# Patient Record
Sex: MALE | Race: OTHER | HISPANIC OR LATINO | URBAN - METROPOLITAN AREA
[De-identification: names, ages, dates, MRNs, and addresses within clinical notes are randomized per-mention and may not be internally consistent; named-entity substitution may affect disease eponyms.]

---

## 2021-10-20 ENCOUNTER — EMERGENCY (EMERGENCY)
Facility: HOSPITAL | Age: 26
LOS: 1 days | Discharge: HOME | End: 2021-10-20
Attending: EMERGENCY MEDICINE | Admitting: STUDENT IN AN ORGANIZED HEALTH CARE EDUCATION/TRAINING PROGRAM
Payer: COMMERCIAL

## 2021-10-20 VITALS
TEMPERATURE: 99 F | DIASTOLIC BLOOD PRESSURE: 98 MMHG | OXYGEN SATURATION: 97 % | SYSTOLIC BLOOD PRESSURE: 141 MMHG | WEIGHT: 237 LBS | HEART RATE: 121 BPM | HEIGHT: 69 IN | RESPIRATION RATE: 18 BRPM
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
## 2021-10-20 DIAGNOSIS — M54.9 DORSALGIA, UNSPECIFIED: ICD-10-CM
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
## 2021-10-20 DIAGNOSIS — M79.89 OTHER SPECIFIED SOFT TISSUE DISORDERS: ICD-10-CM

## 2021-10-20 DIAGNOSIS — N39.0 URINARY TRACT INFECTION, SITE NOT SPECIFIED: ICD-10-CM

## 2021-10-20 DIAGNOSIS — T84.623A INFECTION AND INFLAMMATORY REACTION DUE TO INTERNAL FIXATION DEVICE OF LEFT TIBIA, INITIAL ENCOUNTER: ICD-10-CM

## 2021-10-20 LAB
ALBUMIN SERPL ELPH-MCNC: 4.7 G/DL — SIGNIFICANT CHANGE UP (ref 3.5–5.2)
ALP SERPL-CCNC: 134 U/L — HIGH (ref 30–115)
ALT FLD-CCNC: 23 U/L — SIGNIFICANT CHANGE UP (ref 0–41)
ANION GAP SERPL CALC-SCNC: 17 MMOL/L — HIGH (ref 7–14)
AST SERPL-CCNC: 15 U/L — SIGNIFICANT CHANGE UP (ref 0–41)
BASOPHILS # BLD AUTO: 0.03 K/UL — SIGNIFICANT CHANGE UP (ref 0–0.2)
BASOPHILS NFR BLD AUTO: 0.3 % — SIGNIFICANT CHANGE UP (ref 0–1)
BILIRUB SERPL-MCNC: <0.2 MG/DL — SIGNIFICANT CHANGE UP (ref 0.2–1.2)
BUN SERPL-MCNC: 16 MG/DL — SIGNIFICANT CHANGE UP (ref 10–20)
CALCIUM SERPL-MCNC: 9.8 MG/DL — SIGNIFICANT CHANGE UP (ref 8.5–10.1)
CHLORIDE SERPL-SCNC: 102 MMOL/L — SIGNIFICANT CHANGE UP (ref 98–110)
CO2 SERPL-SCNC: 22 MMOL/L — SIGNIFICANT CHANGE UP (ref 17–32)
CREAT SERPL-MCNC: 0.9 MG/DL — SIGNIFICANT CHANGE UP (ref 0.7–1.5)
EOSINOPHIL # BLD AUTO: 0.05 K/UL — SIGNIFICANT CHANGE UP (ref 0–0.7)
EOSINOPHIL NFR BLD AUTO: 0.5 % — SIGNIFICANT CHANGE UP (ref 0–8)
GLUCOSE SERPL-MCNC: 104 MG/DL — HIGH (ref 70–99)
HCT VFR BLD CALC: 46.6 % — SIGNIFICANT CHANGE UP (ref 42–52)
HGB BLD-MCNC: 14.6 G/DL — SIGNIFICANT CHANGE UP (ref 14–18)
IMM GRANULOCYTES NFR BLD AUTO: 0.3 % — SIGNIFICANT CHANGE UP (ref 0.1–0.3)
LACTATE SERPL-SCNC: 1.2 MMOL/L — SIGNIFICANT CHANGE UP (ref 0.7–2)
LYMPHOCYTES # BLD AUTO: 1.93 K/UL — SIGNIFICANT CHANGE UP (ref 1.2–3.4)
LYMPHOCYTES # BLD AUTO: 20.7 % — SIGNIFICANT CHANGE UP (ref 20.5–51.1)
MCHC RBC-ENTMCNC: 25.8 PG — LOW (ref 27–31)
MCHC RBC-ENTMCNC: 31.3 G/DL — LOW (ref 32–37)
MCV RBC AUTO: 82.5 FL — SIGNIFICANT CHANGE UP (ref 80–94)
MONOCYTES # BLD AUTO: 0.36 K/UL — SIGNIFICANT CHANGE UP (ref 0.1–0.6)
MONOCYTES NFR BLD AUTO: 3.9 % — SIGNIFICANT CHANGE UP (ref 1.7–9.3)
NEUTROPHILS # BLD AUTO: 6.94 K/UL — HIGH (ref 1.4–6.5)
NEUTROPHILS NFR BLD AUTO: 74.3 % — SIGNIFICANT CHANGE UP (ref 42.2–75.2)
NRBC # BLD: 0 /100 WBCS — SIGNIFICANT CHANGE UP (ref 0–0)
PLATELET # BLD AUTO: 345 K/UL — SIGNIFICANT CHANGE UP (ref 130–400)
POTASSIUM SERPL-MCNC: 4 MMOL/L — SIGNIFICANT CHANGE UP (ref 3.5–5)
POTASSIUM SERPL-SCNC: 4 MMOL/L — SIGNIFICANT CHANGE UP (ref 3.5–5)
PROT SERPL-MCNC: 8 G/DL — SIGNIFICANT CHANGE UP (ref 6–8)
RBC # BLD: 5.65 M/UL — SIGNIFICANT CHANGE UP (ref 4.7–6.1)
RBC # FLD: 14.3 % — SIGNIFICANT CHANGE UP (ref 11.5–14.5)
SARS-COV-2 RNA SPEC QL NAA+PROBE: SIGNIFICANT CHANGE UP
SODIUM SERPL-SCNC: 141 MMOL/L — SIGNIFICANT CHANGE UP (ref 135–146)
WBC # BLD: 9.34 K/UL — SIGNIFICANT CHANGE UP (ref 4.8–10.8)
WBC # FLD AUTO: 9.34 K/UL — SIGNIFICANT CHANGE UP (ref 4.8–10.8)

## 2021-10-20 PROCEDURE — 73701 CT LOWER EXTREMITY W/DYE: CPT | Mod: 26,LT,MA

## 2021-10-20 PROCEDURE — 99285 EMERGENCY DEPT VISIT HI MDM: CPT | Mod: 25

## 2021-10-20 RX ORDER — SODIUM CHLORIDE 9 MG/ML
1000 INJECTION, SOLUTION INTRAVENOUS ONCE
Refills: 0 | Status: COMPLETED | OUTPATIENT
Start: 2021-10-20 | End: 2021-10-20

## 2021-10-20 RX ADMIN — SODIUM CHLORIDE 1000 MILLILITER(S): 9 INJECTION, SOLUTION INTRAVENOUS at 19:30

## 2021-10-20 NOTE — ED PROVIDER NOTE - PHYSICAL EXAMINATION
Physical Exam    Vital Signs: I have reviewed the initial vital signs.  Constitutional: well-nourished, appears stated age, no acute distress  Eyes: Conjunctiva pink, Sclera clear, PERRLA, EOMI, no ptosis  Cardiovascular: S1 and S2, regular rate, regular rhythm, well-perfused extremities, radial pulses equal and 2+, calves nonttp, equal in size  Respiratory: unlabored respiratory effort, speaking in full sentences, handling oral secretions,  Musculoskeletal: supple neck, no lower extremity edema, no midline tenderness, paraspinal tenderness, clavicular creptius, painful rom, moving all extremities appropriately, no gross bony deformities + flutant areas of swelling to lateral thigh slightly superior to knee.  Integumentary: warm, dry, no rashes, lacerations,  Neurologic: awake, alert
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
## 2021-10-20 NOTE — ED PROVIDER NOTE - CARE PLAN
Principal Discharge DX:	Back pain  Secondary Diagnosis:	Acute UTI   1 Principal Discharge DX:	Infected hardware in left leg

## 2021-10-20 NOTE — ED PROVIDER NOTE - NSFOLLOWUPCLINICS_GEN_ALL_ED_FT
[facility]  Orthpedic  [address]  [place], [place]   Phone: [phone]  Fax:   Follow Up Time: 1 week

## 2021-10-20 NOTE — ED PROVIDER NOTE - NSFOLLOWUPINSTRUCTIONS_ED_ALL_ED_FT
Please complete your MRI next week and follow up with Orthopaedics after. Follow up information has been provided.    Antibiotics have been sent to your pharmacy.     Return to the Emergency Room if you develop fever, abscess begins to drain from the skin, or pain is worsening.

## 2021-10-20 NOTE — ED PROVIDER NOTE - NS ED ROS FT
Constitutional: (-) fever (-) chills  (-) lightheadedness   Eyes/ENT: (-) blurry vision, (-) epistaxis (-) rhinorrhea (-) nasal congestion  Cardiovascular: (-) chest pain, (-) syncope (-) palpitations   Respiratory: (-) cough, (-) shortness of breath (-) pleurisy   Gastrointestinal: (-) vomiting, (-) diarrhea (-) abdominal pain (-) nausea (-) anorexia  Musculoskeletal: (-) neck pain, (-) back pain, (-) joint pain (-) joint swelling (-) painful ROM  Integumentary: (-) rash, (-) edema (-) lacerations (-) pruritis (+) swelling  Neurological: (-) headache, (-) altered mental status (-) LOC (-) dizziness (-) paresthesias (-)

## 2021-10-20 NOTE — ED PROVIDER NOTE - PROGRESS NOTE DETAILS
Dr. Casillas: received sign out. Pt pending full work up. On reassessment, pt not in any discomfort. CO- pt endorsed to Dr. Bowers- pending ct read, surg c/s Ortho consulted, will come evaluate pt. Discussed over the phone w/ Ortho resident Jenn (who spoke to attending Louis), pt not septic at this time and not requiring emergency surgery. Pt has an MRI scheduled for next week Wednesday, pt will f/u at Ortho clinic after MRI. Will send antibiotics to pharmacy. Return precautions discussed. Ready for discharge.

## 2021-10-20 NOTE — ED PROVIDER NOTE - PATIENT PORTAL LINK FT
You can access the FollowMyHealth Patient Portal offered by [facility] by registering at the following website: [email or web address]. By joining [facility]’s FollowMyHealth portal, you will also be able to view your health information using other applications (apps) compatible with our system.
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
## 2021-10-21 VITALS
DIASTOLIC BLOOD PRESSURE: 75 MMHG | OXYGEN SATURATION: 99 % | SYSTOLIC BLOOD PRESSURE: 116 MMHG | TEMPERATURE: 99 F | RESPIRATION RATE: 18 BRPM | HEART RATE: 85 BPM

## 2021-10-21 LAB
APTT BLD: 33.5 SEC — SIGNIFICANT CHANGE UP (ref 27–39.2)
BLD GP AB SCN SERPL QL: SIGNIFICANT CHANGE UP
BLD GP AB SCN SERPL QL: SIGNIFICANT CHANGE UP
CRP SERPL-MCNC: 17 MG/L — HIGH
ERYTHROCYTE [SEDIMENTATION RATE] IN BLOOD: 40 MM/HR — HIGH (ref 0–10)
INR BLD: 1.11 RATIO — SIGNIFICANT CHANGE UP (ref 0.65–1.3)
PROTHROM AB SERPL-ACNC: 12.8 SEC — SIGNIFICANT CHANGE UP (ref 9.95–12.87)

## 2021-10-21 PROCEDURE — 73560 X-RAY EXAM OF KNEE 1 OR 2: CPT | Mod: 26,LT

## 2021-10-21 PROCEDURE — 73552 X-RAY EXAM OF FEMUR 2/>: CPT | Mod: 26,LT

## 2021-10-21 PROCEDURE — 93010 ELECTROCARDIOGRAM REPORT: CPT

## 2021-10-21 RX ORDER — AZTREONAM 2 G
1 VIAL (EA) INJECTION
Qty: 20 | Refills: 0
Start: 2021-10-21 | End: 2021-10-30
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
## 2021-10-24 ENCOUNTER — INPATIENT (INPATIENT)
Facility: HOSPITAL | Age: 26
LOS: 14 days | Discharge: HOME HEALTH | End: 2021-11-08
Attending: ORTHOPAEDIC SURGERY | Admitting: ORTHOPAEDIC SURGERY
Payer: COMMERCIAL

## 2021-10-24 VITALS
RESPIRATION RATE: 18 BRPM | HEIGHT: 69 IN | HEART RATE: 104 BPM | SYSTOLIC BLOOD PRESSURE: 133 MMHG | OXYGEN SATURATION: 99 % | TEMPERATURE: 98 F | DIASTOLIC BLOOD PRESSURE: 100 MMHG

## 2021-10-24 DIAGNOSIS — E66.9 OBESITY, UNSPECIFIED: ICD-10-CM

## 2021-10-24 DIAGNOSIS — T84.621A INFECTION AND INFLAMMATORY REACTION DUE TO INTERNAL FIXATION DEVICE OF LEFT FEMUR, INITIAL ENCOUNTER: ICD-10-CM
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
## 2021-10-24 DIAGNOSIS — M86.152 OTHER ACUTE OSTEOMYELITIS, LEFT FEMUR: ICD-10-CM
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
## 2021-10-24 DIAGNOSIS — L02.416 CUTANEOUS ABSCESS OF LEFT LOWER LIMB: ICD-10-CM

## 2021-10-24 LAB
ALBUMIN SERPL ELPH-MCNC: 4.9 G/DL — SIGNIFICANT CHANGE UP (ref 3.5–5.2)
ALP SERPL-CCNC: 137 U/L — HIGH (ref 30–115)
ALT FLD-CCNC: 27 U/L — SIGNIFICANT CHANGE UP (ref 0–41)
ANION GAP SERPL CALC-SCNC: 13 MMOL/L — SIGNIFICANT CHANGE UP (ref 7–14)
AST SERPL-CCNC: 15 U/L — SIGNIFICANT CHANGE UP (ref 0–41)
BASOPHILS # BLD AUTO: 0.03 K/UL — SIGNIFICANT CHANGE UP (ref 0–0.2)
BASOPHILS NFR BLD AUTO: 0.3 % — SIGNIFICANT CHANGE UP (ref 0–1)
BILIRUB SERPL-MCNC: <0.2 MG/DL — SIGNIFICANT CHANGE UP (ref 0.2–1.2)
BLD GP AB SCN SERPL QL: SIGNIFICANT CHANGE UP
BUN SERPL-MCNC: 14 MG/DL — SIGNIFICANT CHANGE UP (ref 10–20)
CALCIUM SERPL-MCNC: 10 MG/DL — SIGNIFICANT CHANGE UP (ref 8.5–10.1)
CHLORIDE SERPL-SCNC: 102 MMOL/L — SIGNIFICANT CHANGE UP (ref 98–110)
CO2 SERPL-SCNC: 25 MMOL/L — SIGNIFICANT CHANGE UP (ref 17–32)
CREAT SERPL-MCNC: 0.6 MG/DL — LOW (ref 0.7–1.5)
EOSINOPHIL # BLD AUTO: 0.14 K/UL — SIGNIFICANT CHANGE UP (ref 0–0.7)
EOSINOPHIL NFR BLD AUTO: 1.6 % — SIGNIFICANT CHANGE UP (ref 0–8)
ERYTHROCYTE [SEDIMENTATION RATE] IN BLOOD: 18 MM/HR — HIGH (ref 0–10)
GLUCOSE SERPL-MCNC: 112 MG/DL — HIGH (ref 70–99)
HCT VFR BLD CALC: 44.9 % — SIGNIFICANT CHANGE UP (ref 42–52)
HGB BLD-MCNC: 14 G/DL — SIGNIFICANT CHANGE UP (ref 14–18)
IMM GRANULOCYTES NFR BLD AUTO: 0.3 % — SIGNIFICANT CHANGE UP (ref 0.1–0.3)
LACTATE SERPL-SCNC: 1.2 MMOL/L — SIGNIFICANT CHANGE UP (ref 0.7–2)
LYMPHOCYTES # BLD AUTO: 2.52 K/UL — SIGNIFICANT CHANGE UP (ref 1.2–3.4)
LYMPHOCYTES # BLD AUTO: 29.1 % — SIGNIFICANT CHANGE UP (ref 20.5–51.1)
MCHC RBC-ENTMCNC: 25.5 PG — LOW (ref 27–31)
MCHC RBC-ENTMCNC: 31.2 G/DL — LOW (ref 32–37)
MCV RBC AUTO: 81.8 FL — SIGNIFICANT CHANGE UP (ref 80–94)
MONOCYTES # BLD AUTO: 0.68 K/UL — HIGH (ref 0.1–0.6)
MONOCYTES NFR BLD AUTO: 7.9 % — SIGNIFICANT CHANGE UP (ref 1.7–9.3)
NEUTROPHILS # BLD AUTO: 5.25 K/UL — SIGNIFICANT CHANGE UP (ref 1.4–6.5)
NEUTROPHILS NFR BLD AUTO: 60.8 % — SIGNIFICANT CHANGE UP (ref 42.2–75.2)
NRBC # BLD: 0 /100 WBCS — SIGNIFICANT CHANGE UP (ref 0–0)
PLATELET # BLD AUTO: 313 K/UL — SIGNIFICANT CHANGE UP (ref 130–400)
POTASSIUM SERPL-MCNC: 4.3 MMOL/L — SIGNIFICANT CHANGE UP (ref 3.5–5)
POTASSIUM SERPL-SCNC: 4.3 MMOL/L — SIGNIFICANT CHANGE UP (ref 3.5–5)
PROT SERPL-MCNC: 8.2 G/DL — HIGH (ref 6–8)
RBC # BLD: 5.49 M/UL — SIGNIFICANT CHANGE UP (ref 4.7–6.1)
RBC # FLD: 14.6 % — HIGH (ref 11.5–14.5)
SODIUM SERPL-SCNC: 140 MMOL/L — SIGNIFICANT CHANGE UP (ref 135–146)
WBC # BLD: 8.65 K/UL — SIGNIFICANT CHANGE UP (ref 4.8–10.8)
WBC # FLD AUTO: 8.65 K/UL — SIGNIFICANT CHANGE UP (ref 4.8–10.8)

## 2021-10-24 PROCEDURE — 93010 ELECTROCARDIOGRAM REPORT: CPT

## 2021-10-24 PROCEDURE — 71046 X-RAY EXAM CHEST 2 VIEWS: CPT | Mod: 26

## 2021-10-24 PROCEDURE — 73562 X-RAY EXAM OF KNEE 3: CPT | Mod: 26,LT

## 2021-10-24 PROCEDURE — 99285 EMERGENCY DEPT VISIT HI MDM: CPT
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
## 2021-10-25 LAB
APTT BLD: 30.7 SEC — SIGNIFICANT CHANGE UP (ref 27–39.2)
CRP SERPL-MCNC: 25 MG/L — HIGH
INR BLD: 1.08 RATIO — SIGNIFICANT CHANGE UP (ref 0.65–1.3)
PROTHROM AB SERPL-ACNC: 12.4 SEC — SIGNIFICANT CHANGE UP (ref 9.95–12.87)
SARS-COV-2 RNA SPEC QL NAA+PROBE: SIGNIFICANT CHANGE UP

## 2021-10-25 PROCEDURE — 99221 1ST HOSP IP/OBS SF/LOW 40: CPT

## 2021-10-25 PROCEDURE — 73502 X-RAY EXAM HIP UNI 2-3 VIEWS: CPT | Mod: 26,LT

## 2021-10-25 PROCEDURE — 73552 X-RAY EXAM OF FEMUR 2/>: CPT | Mod: 26,LT

## 2021-10-25 RX ORDER — OXYCODONE HYDROCHLORIDE 5 MG/1
5 TABLET ORAL EVERY 4 HOURS
Refills: 0 | Status: DISCONTINUED | OUTPATIENT
Start: 2021-10-25 | End: 2021-10-25

## 2021-10-25 RX ORDER — HYDROMORPHONE HYDROCHLORIDE 2 MG/ML
1 INJECTION INTRAMUSCULAR; INTRAVENOUS; SUBCUTANEOUS
Refills: 0 | Status: DISCONTINUED | OUTPATIENT
Start: 2021-10-25 | End: 2021-10-25

## 2021-10-25 RX ORDER — SODIUM CHLORIDE 9 MG/ML
1000 INJECTION, SOLUTION INTRAVENOUS
Refills: 0 | Status: DISCONTINUED | OUTPATIENT
Start: 2021-10-25 | End: 2021-10-25

## 2021-10-25 RX ORDER — CEFAZOLIN SODIUM 1 G
2000 VIAL (EA) INJECTION EVERY 8 HOURS
Refills: 0 | Status: DISCONTINUED | OUTPATIENT
Start: 2021-10-26 | End: 2021-10-26

## 2021-10-25 RX ORDER — ACETAMINOPHEN 500 MG
650 TABLET ORAL EVERY 6 HOURS
Refills: 0 | Status: DISCONTINUED | OUTPATIENT
Start: 2021-10-25 | End: 2021-11-01

## 2021-10-25 RX ORDER — ENOXAPARIN SODIUM 100 MG/ML
40 INJECTION SUBCUTANEOUS DAILY
Refills: 0 | Status: DISCONTINUED | OUTPATIENT
Start: 2021-10-25 | End: 2021-10-31

## 2021-10-25 RX ORDER — DIPHENHYDRAMINE HCL 50 MG
25 CAPSULE ORAL AT BEDTIME
Refills: 0 | Status: DISCONTINUED | OUTPATIENT
Start: 2021-10-25 | End: 2021-11-01

## 2021-10-25 RX ORDER — MAGNESIUM HYDROXIDE 400 MG/1
30 TABLET, CHEWABLE ORAL DAILY
Refills: 0 | Status: DISCONTINUED | OUTPATIENT
Start: 2021-10-25 | End: 2021-11-01

## 2021-10-25 RX ORDER — ONDANSETRON 8 MG/1
4 TABLET, FILM COATED ORAL EVERY 6 HOURS
Refills: 0 | Status: DISCONTINUED | OUTPATIENT
Start: 2021-10-25 | End: 2021-10-25

## 2021-10-25 RX ORDER — ENOXAPARIN SODIUM 100 MG/ML
40 INJECTION SUBCUTANEOUS DAILY
Refills: 0 | Status: DISCONTINUED | OUTPATIENT
Start: 2021-10-25 | End: 2021-10-25

## 2021-10-25 RX ORDER — MAGNESIUM HYDROXIDE 400 MG/1
30 TABLET, CHEWABLE ORAL DAILY
Refills: 0 | Status: DISCONTINUED | OUTPATIENT
Start: 2021-10-25 | End: 2021-10-25

## 2021-10-25 RX ORDER — CEFAZOLIN SODIUM 1 G
2000 VIAL (EA) INJECTION EVERY 8 HOURS
Refills: 0 | Status: DISCONTINUED | OUTPATIENT
Start: 2021-10-25 | End: 2021-10-25

## 2021-10-25 RX ORDER — HYDROMORPHONE HYDROCHLORIDE 2 MG/ML
0.5 INJECTION INTRAMUSCULAR; INTRAVENOUS; SUBCUTANEOUS
Refills: 0 | Status: DISCONTINUED | OUTPATIENT
Start: 2021-10-25 | End: 2021-10-25

## 2021-10-25 RX ORDER — ONDANSETRON 8 MG/1
4 TABLET, FILM COATED ORAL ONCE
Refills: 0 | Status: DISCONTINUED | OUTPATIENT
Start: 2021-10-25 | End: 2021-10-25

## 2021-10-25 RX ORDER — DIPHENHYDRAMINE HCL 50 MG
25 CAPSULE ORAL AT BEDTIME
Refills: 0 | Status: DISCONTINUED | OUTPATIENT
Start: 2021-10-25 | End: 2021-10-25

## 2021-10-25 RX ORDER — OXYCODONE HYDROCHLORIDE 5 MG/1
5 TABLET ORAL EVERY 4 HOURS
Refills: 0 | Status: DISCONTINUED | OUTPATIENT
Start: 2021-10-25 | End: 2021-11-01

## 2021-10-25 RX ORDER — ONDANSETRON 8 MG/1
4 TABLET, FILM COATED ORAL EVERY 6 HOURS
Refills: 0 | Status: DISCONTINUED | OUTPATIENT
Start: 2021-10-25 | End: 2021-11-01

## 2021-10-25 RX ORDER — ACETAMINOPHEN 500 MG
650 TABLET ORAL EVERY 6 HOURS
Refills: 0 | Status: DISCONTINUED | OUTPATIENT
Start: 2021-10-25 | End: 2021-10-25

## 2021-10-25 RX ADMIN — HYDROMORPHONE HYDROCHLORIDE 0.5 MILLIGRAM(S): 2 INJECTION INTRAMUSCULAR; INTRAVENOUS; SUBCUTANEOUS at 21:00

## 2021-10-25 RX ADMIN — Medication 100 MILLIGRAM(S): at 00:49

## 2021-10-25 RX ADMIN — Medication 100 MILLIGRAM(S): at 08:28

## 2021-10-25 RX ADMIN — SODIUM CHLORIDE 75 MILLILITER(S): 9 INJECTION, SOLUTION INTRAVENOUS at 08:30

## 2021-10-25 RX ADMIN — HYDROMORPHONE HYDROCHLORIDE 1 MILLIGRAM(S): 2 INJECTION INTRAMUSCULAR; INTRAVENOUS; SUBCUTANEOUS at 21:30

## 2021-10-25 RX ADMIN — HYDROMORPHONE HYDROCHLORIDE 0.5 MILLIGRAM(S): 2 INJECTION INTRAMUSCULAR; INTRAVENOUS; SUBCUTANEOUS at 21:15

## 2021-10-25 RX ADMIN — Medication 100 MILLIGRAM(S): at 16:12

## 2021-10-25 RX ADMIN — Medication 1 TABLET(S): at 16:12

## 2021-10-25 RX ADMIN — HYDROMORPHONE HYDROCHLORIDE 1 MILLIGRAM(S): 2 INJECTION INTRAMUSCULAR; INTRAVENOUS; SUBCUTANEOUS at 21:15

## 2021-10-25 RX ADMIN — HYDROMORPHONE HYDROCHLORIDE 0.5 MILLIGRAM(S): 2 INJECTION INTRAMUSCULAR; INTRAVENOUS; SUBCUTANEOUS at 20:45

## 2021-10-25 NOTE — ED PROVIDER NOTE - CLINICAL SUMMARY MEDICAL DECISION MAKING FREE TEXT BOX
27 yo M, healthy, known hardware infection to L ORIF currently on oral abx here for worsening swelling and drainage -- no fever, chills, pain is well controlled.    VS normal, has significant fluctuance to lateral aspect of L knee with open area draining purulent fluid. Area is warm, red, indurated. Has full ROm of knee, antalgic gait, good pulses.    Labs without leukocytosis, downtrending ESR.  However will need admission for IV abx, consideration of I/D of soft tissue abscess prior to or in conjunction with hardware removal by ortho.

## 2021-10-25 NOTE — PRE PROCEDURE NOTE - PRE PROCEDURE EVALUATION
ORTHOPEDIC SURGERY PRE OP NOTE    Diagnosis: left lower extremity abscess    Planned Procedure: irrigation and debridement left lower extremity abscess, possible removal of hardware left distal femur    Consent: TO BE OBTAINED BY ATTENDING                   Risks/benefits/alternatives were discussed with the patient/family and they wish to proceed with surgery.       ANTICIPATED DATE OF PROCEDURE: 10/25/2021  SCHEDULED CASE OR ADD-ON CASE: ADD-ON      Consent:     Clearances: none needed    T(C): 36.6 (10-25-21 @ 08:42), Max: 36.8 (10-25-21 @ 01:32)  HR: 68 (10-25-21 @ 08:42) (68 - 104)  BP: 111/58 (10-25-21 @ 08:42) (111/58 - 133/100)  RR: 18 (10-25-21 @ 08:42) (18 - 18)  SpO2: 98% (10-25-21 @ 08:42) (98% - 100%)    Labs:                        14.0   8.65  )-----------( 313      ( 24 Oct 2021 22:10 )             44.9     10-24    140  |  102  |  14  ----------------------------<  112<H>  4.3   |  25  |  0.6<L>    Ca    10.0      24 Oct 2021 22:10    TPro  8.2<H>  /  Alb  4.9  /  TBili  <0.2  /  DBili  x   /  AST  15  /  ALT  27  /  AlkPhos  137<H>  10-24    PT/INR - ( 25 Oct 2021 01:00 )   PT: 12.40 sec;   INR: 1.08 ratio         PTT - ( 25 Oct 2021 01:00 )  PTT:30.7 sec  Type and Screen X 2:    COVID-19 PCR: NotDetec (25 Oct 2021 00:55)  COVID-19 PCR: NotDetec (20 Oct 2021 17:58)    [X] EKG:   [X] CXR:       DIET: NPO   IVF:      ANTICOAGULATION STATUS ( include name of anticoagulant): NONE                         A/P: Patient is a 26y y/o Male with a left lower extremity abscess pending irrigation and debridement left lower extremity, possible removal of hardware.    [ ] NPO and IVF   [ ]pain control/analgesia prn  [ ]Incentive Spirometry   [ ]F/U Pending Labs  [ ]Notify Ortho with any questions- spectra [id number]    [ ]DISCUSSED WITH PRIMARY TEAM MEMBER (name of team member): ortho is primary team  [ ]Date and Time DISCUSSED WITH PRIMARY TEAM MEMBER: ortho is primary team
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
## 2021-10-25 NOTE — CONSULT NOTE ADULT - ASSESSMENT
Left knee abscess    RECOMMENDATION:  Patient going to OR with Orthopedics tonight.   Dr. Murray may see patient intraoperatively as well for debridement, patient consented.   May need further debridement with Burn.   WCX taken today, IV abx as indicated/per ID

## 2021-10-25 NOTE — ED PROVIDER NOTE - OBJECTIVE STATEMENT
27 yo M with PMHx of intramedullary mp fixation of the left femoral shaft in 2016 presents to the ED c/o worsening swelling and development of purulent drainage from left knee infection. Pt was in ED few days ago and had CT showing hardware infection and osteo--was d/c home to follow-up outpt for MRI. Pt has been taking prescribed abx but noted worsening symptoms so he returned to ED. Pt denies other complaints. Pt denies fever, chills, nausea, vomiting, abdominal pain, diarrhea, headache, dizziness, weakness, chest pain, SOB, back pain, LOC, trauma, urinary symptoms, cough, calf pain/swelling, recent travel, recent surgery.

## 2021-10-25 NOTE — ED PROVIDER NOTE - NS ED ROS FT
Review of Systems  Constitutional:  No fever, chills.  Eyes:  No visual changes, eye pain, or discharge.  ENMT:  No hearing changes, pain, or discharge. No nasal congestion, discharge, or bleeding. No throat pain, swelling, or difficulty swallowing.  Cardiac:  No chest pain, palpitations, syncope, or edema.  Respiratory:  No dyspnea, cough. No hemoptysis.  GI:  No nausea, vomiting, diarrhea, or abdominal pain.   :  No dysuria, hematuria, frequency, or burning.   MS:  No back pain. (+) left knee infection  Skin:  No skin rash, pruritis, jaundice, or lesions.  Neuro:  No headache, dizziness, loss of sensation, or focal weakness.  No change in mental status.

## 2021-10-25 NOTE — PROGRESS NOTE ADULT - SUBJECTIVE AND OBJECTIVE BOX
ORTHOPAEDIC SURGERY PROGRESS NOTE    Patient seen and examined in ED. Endorses mild pain at the distal lateral aspect of his L knee, worse with palpation. Denies f/c, cp/sob, n/v, numbness/tingling.     EXAM  LLE:  No open skin or wounds  No active drainage  [id number] area of swelling and erythema on lateral distal aspect of the L upper leg  Palpable fluctuance appreciated in area of swelling, draining purulent fluid  Swelling is mildly ttp   Painless knee ROM from 5-100d  Able to actively SLR.  SILT DP/SP/T/Nieves/Sa.   EHL/FHL/TA/Gs motor intact.  2+ DP/PT pulses with brisk cap refill distally.    A&P:  26y Male with left leg abscess, now actively draining; patient remains not septic.   -WBAT RLE  -pending burn consult; will keep NPO in case of irrigation and debridement with burn team today  -pain control  -abx  -MRI LLE with contrast

## 2021-10-25 NOTE — CHART NOTE - NSCHARTNOTEFT_GEN_A_CORE
PACU ANESTHESIA ADMISSION NOTE      Procedure: left leg ID and removal of hardware   Post op diagnosis:      ____  Intubated  TV:______       Rate: ______      FiO2: ______    ___x_  Patent Airway    ____  Full return of protective reflexes    ____  Full recovery from anesthesia / back to baseline status    Vitals:  T(C): 37.8   HR: 102   BP: 140/80   RR: 18   SpO2: 99%  Mental Status:  __x__ Awake   _____ Alert   _____ Drowsy   _____ Sedated    Nausea/Vomiting:  ____ Yes, See Post - Op Orders      x____ No    Pain Scale (0-10):  _____    Treatment: ____ None    _x___ See Post - Op/PCA Orders    Post - Operative Fluids:   ____ Oral   _x___ See Post - Op Orders    Plan: Discharge:   ____Home       ___x__Floor     _____Critical Care    _____  Other:_________________    Comments:  report given to RN DC to pacu

## 2021-10-25 NOTE — BRIEF OPERATIVE NOTE - OPERATION/FINDINGS
irrigation and debridement performed over distal locking screws on left leg; no purulent fluid was appreciated at prior incision for distal interlocks/along tract to screws. Incision and drainage with irrigation and debridement performed over left leg abscess. Abscess was approximately 4cej2in in size; debrided to depth of approximately 3cm. approximately 5-10cc of purulent fluid expressed. Necrotic tissue debrided. Distal interlock incisions closed; incision over abscess partially closed with packing placed.

## 2021-10-25 NOTE — CONSULT NOTE ADULT - ASSESSMENT
ASSESSMENT  26yMale presents with pain in Left leg. Has soft tissue infection with assocated abscess    IMPRESSION  #Left Lower Extremity soft tissue infection/possible OM in setting of intrameedullary mp fixation   - s/p IMN mp fixation of femoral shaft fracture   - CT Lower Extremity w/ IV Cont, Left (10.20.21 @ 22:50): Lucency adjacent to the distal mp and screws is demonstrated at multiple levels. Cortical disruption is noted at multiple locations, most prominently in the distal posterior femoral shaft (series 7 image 391). Large, ill-defined abscess extending from the medullary canal of the left femur into the surrounding soft tissues including the lateral thigh musculature and subcutaneous tissues (series 7 image 381). The dominant subcutaneous collectionis noted lateral to the distal femoral shaft just above the knee measuring up to 9 x 2 enhanced centimeters.    #Obesity BMI (kg/m2): 35, 35  #Abx allergy: NKDA      RECOMMENDATIONS  This is a preliminary incomplete pended note, all final recommendations to follow after interview and examination of the patient.    Please call or message on [employer] Teams if with any questions.  Spectra [phone]   ASSESSMENT  26yMale presents with pain in Left leg. Has soft tissue infection with assocated abscess    IMPRESSION  #Left Lower Extremity soft tissue infection/possible OM in setting of intramedullary mp fixation   - s/p IMN mp fixation of femoral shaft fracture   - CT Lower Extremity w/ IV Cont, Left (10.20.21 @ 22:50): Lucency adjacent to the distal mp and screws is demonstrated at multiple levels. Cortical disruption is noted at multiple locations, most prominently in the distal posterior femoral shaft (series 7 image 391). Large, ill-defined abscess extending from the medullary canal of the left femur into the surrounding soft tissues including the lateral thigh musculature and subcutaneous tissues (series 7 image 381). The dominant subcutaneous collectionis noted lateral to the distal femoral shaft just above the knee measuring up to 9 x 2 enhanced centimeters.    #Obesity BMI (kg/m2): 35, 35  #Abx allergy: NKDA      RECOMMENDATIONS  - planned for irrigation/debridement, possible hardware removal   - as no SIRS on admission, would hold antibiotics to increase yield  - perioperative antibiotics per ortho  - after surgery, please start vancomycin 1500 mg q 12 hours and ceftriaxone 2g daily   - will follow-up OR cultures   - will likely need prolonged course     Please call or message on [employer] Teams if with any questions.  Spectra [phone]

## 2021-10-25 NOTE — ED PROVIDER NOTE - PHYSICAL EXAMINATION
VITAL SIGNS: I have reviewed nursing notes and confirm.  CONSTITUTIONAL: Well-developed; well-nourished; in no acute distress.  SKIN: Skin exam is warm and dry, no acute rash.  HEAD: Normocephalic; atraumatic.  EYES: Conjunctiva and sclera clear.  ENT: No nasal discharge; airway clear.   CARD: S1, S2 normal; no murmurs, gallops, or rubs. Regular rate and rhythm.  RESP: No wheezes, rales or rhonchi. Speaking in full sentences.   EXT: Normal ROM. (+) fluctuance, erythema to lateral aspect of left knee with small area draining purulent fluid. No crepitus. FROM of knee. DP 2+.   NEURO: Alert, oriented. Grossly unremarkable. No focal deficits.
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
## 2021-10-25 NOTE — H&P ADULT - ASSESSMENT
Pt Name: ABIMAEL MEHTA  MRN: [id number]      HPI: 26yMale presents with pain in right leg. Has soft tissue infection with assocated abscess. Told to return to the ER if wound started draining. No fevers/chills. hx of right femur IMN selene montano in 2016      PAST MEDICAL & SURGICAL HISTORY:      Allergies: No Known Allergies      Medications: none      PHYSICAL EXAM:    Vital Signs Last 24 Hrs  T(C): 36.7 (24 Oct 2021 21:03), Max: 36.7 (24 Oct 2021 21:03)  T(F): 98.1 (24 Oct 2021 21:03), Max: 98.1 (24 Oct 2021 21:03)  HR: 104 (24 Oct 2021 21:03) (104 - 104)  BP: 133/100 (24 Oct 2021 21:03) (133/100 - 133/100)  BP(mean): --  RR: 18 (24 Oct 2021 21:03) (18 - 18)  SpO2: 99% (24 Oct 2021 21:03) (99% - 99%)    Physical Exam:  General: NAD, Alert, Awake and oriented    LLE:  No open skin or wounds  No active drainage  [id number] area of swelling and erythema on lateral distal aspect of the L upper leg  Palpable fluctuance appreciated in area of swelling, draining purulent fluid  Swelling is mildly ttp   Painless knee ROM from 5-100d  No pain with log-roll or axial compression  Able to actively SLR.  SILT DP/SP/T/Nieves/Sa.   EHL/FHL/TA/Gs motor intact.  2+ DP/PT pulses with brisk cap refill distally.    Labs:                        14.0   8.65  )-----------( 313      ( 24 Oct 2021 22:10 )             44.9     10-24    140  |  102  |  14  ----------------------------<  112<H>  4.3   |  25  |  0.6<L>    Ca    10.0      24 Oct 2021 22:10    TPro  8.2<H>  /  Alb  4.9  /  TBili  <0.2  /  DBili  x   /  AST  15  /  ALT  27  /  AlkPhos  137<H>  10-24    Imaging: No obvious fracture or dislocation.     A/P:    26y Male with right leg abscess  -burn team consult for wound management  -IV abx  -WBAT RLE  -pain control   -MRI w/ and w/o

## 2021-10-26 LAB
COVID-19 SPIKE DOMAIN AB INTERP: POSITIVE
COVID-19 SPIKE DOMAIN ANTIBODY RESULT: >250 U/ML — HIGH
CULTURE RESULTS: SIGNIFICANT CHANGE UP
CULTURE RESULTS: SIGNIFICANT CHANGE UP
SARS-COV-2 IGG+IGM SERPL QL IA: >250 U/ML — HIGH
SARS-COV-2 IGG+IGM SERPL QL IA: POSITIVE
SPECIMEN SOURCE: SIGNIFICANT CHANGE UP
SPECIMEN SOURCE: SIGNIFICANT CHANGE UP

## 2021-10-26 RX ORDER — CEFTRIAXONE 500 MG/1
2000 INJECTION, POWDER, FOR SOLUTION INTRAMUSCULAR; INTRAVENOUS EVERY 24 HOURS
Refills: 0 | Status: DISCONTINUED | OUTPATIENT
Start: 2021-10-26 | End: 2021-10-27

## 2021-10-26 RX ORDER — VANCOMYCIN HCL 1 G
1500 VIAL (EA) INTRAVENOUS EVERY 12 HOURS
Refills: 0 | Status: DISCONTINUED | OUTPATIENT
Start: 2021-10-26 | End: 2021-10-27

## 2021-10-26 RX ADMIN — Medication 1 TABLET(S): at 12:34

## 2021-10-26 RX ADMIN — Medication 300 MILLIGRAM(S): at 18:26

## 2021-10-26 RX ADMIN — CEFTRIAXONE 100 MILLIGRAM(S): 500 INJECTION, POWDER, FOR SOLUTION INTRAMUSCULAR; INTRAVENOUS at 06:50

## 2021-10-26 RX ADMIN — ENOXAPARIN SODIUM 40 MILLIGRAM(S): 100 INJECTION SUBCUTANEOUS at 12:35

## 2021-10-26 RX ADMIN — Medication 300 MILLIGRAM(S): at 06:50

## 2021-10-26 RX ADMIN — Medication 100 MILLIGRAM(S): at 05:31

## 2021-10-26 NOTE — PROGRESS NOTE ADULT - SUBJECTIVE AND OBJECTIVE BOX
ORTHOPAEDIC SURGERY POST-OP CHECK    PROCEDURE: incision and drainage left lower extremity abscess    Patient seen and examined approximately 2h s/p above procedure. Pain well controlled. No new complaints.    EXAM  LLE  Dressing in place over L knee, c/d/i  Motor intact ta/gs, ehl/fhl  SILT dp/sp/s/s  Foot wwp, DP pulse 2+, CR <2s    A&P  27yo M w/ LLE abscess now s/p I&D with ortho/burn earlier this evening. Doing well on post-op check.  -WBAT LLE  -pain control  -DVT ppx  -follow up intra-op cultures  -ID consult  -pull packing from wound on morning of 10/27  -appreciate burn recs for wound care management    Please call ortho with any questions or concerns.

## 2021-10-26 NOTE — PROGRESS NOTE ADULT - ASSESSMENT
ORTHO PROGRESS NOTE     27yo M w/ LLE abscess now s/p I&D with ortho/burn POD1    Patient seen and examined at bedside . The patient is awake and alert in NAD. No complaints of chest pain, SOB, N/V.    MEDICATIONS  (STANDING):  cefTRIAXone   IVPB 2000 milliGRAM(s) IV Intermittent every 24 hours  enoxaparin Injectable 40 milliGRAM(s) SubCutaneous daily  multivitamin 1 Tablet(s) Oral daily  vancomycin  IVPB 1500 milliGRAM(s) IV Intermittent every 12 hours    MEDICATIONS  (PRN):  acetaminophen     Tablet .. 650 milliGRAM(s) Oral every 6 hours PRN Temp greater or equal to 38C (100.4F), Mild Pain (1 - 3), Moderate Pain (4 - 6)  diphenhydrAMINE 25 milliGRAM(s) Oral at bedtime PRN Insomnia  magnesium hydroxide Suspension 30 milliLiter(s) Oral daily PRN Constipation  ondansetron Injectable 4 milliGRAM(s) IV Push every 6 hours PRN Nausea and/or Vomiting  oxyCODONE    IR 5 milliGRAM(s) Oral every 4 hours PRN Severe Pain (7 - 10)      Vital Signs Last 24 Hrs  T(C): 36.2 (26 Oct 2021 05:00), Max: 37.8 (25 Oct 2021 18:06)  T(F): 97.1 (26 Oct 2021 05:00), Max: 100 (25 Oct 2021 18:06)  HR: 81 (26 Oct 2021 05:00) (64 - 104)  BP: 112/64 (26 Oct 2021 05:00) (106/60 - 148/86)  BP(mean): --  RR: 18 (26 Oct 2021 05:00) (14 - 22)  SpO2: 98% (26 Oct 2021 05:00) (95% - 100%)    PE:   Dressing C/D/I   Compartments soft and compressible  Motor intact distally  SILT distally  CR<2sec  palpable pulses                               14.0   8.65  )-----------( 313      ( 24 Oct 2021 22:10 )             44.9     10-24    140  |  102  |  14  ----------------------------<  112<H>  4.3   |  25  |  0.6<L>    Ca    10.0      24 Oct 2021 22:10    TPro  8.2<H>  /  Alb  4.9  /  TBili  <0.2  /  DBili  x   /  AST  15  /  ALT  27  /  AlkPhos  137<H>  10-24    PT/INR - ( 25 Oct 2021 01:00 )   PT: 12.40 sec;   INR: 1.08 ratio         PTT - ( 25 Oct 2021 01:00 )  PTT:30.7 sec      10-25-21 @ 07:01  -  10-26-21 @ 07:00  --------------------------------------------------------  IN: 125 mL / OUT: 300 mL / NET: -175 mL          27yo M w/ LLE abscess now s/p I&D with ortho/burn POD1. Doing well   -WBAT LLE  -pain control  -DVT ppx  -follow up intra-op cultures  -ID consult  -pull packing from wound on morning of 10/27  -appreciate burn recs for wound care management

## 2021-10-27 LAB
ALBUMIN SERPL ELPH-MCNC: 4.1 G/DL — SIGNIFICANT CHANGE UP (ref 3.5–5.2)
ALP SERPL-CCNC: 106 U/L — SIGNIFICANT CHANGE UP (ref 30–115)
ALT FLD-CCNC: 17 U/L — SIGNIFICANT CHANGE UP (ref 0–41)
ANION GAP SERPL CALC-SCNC: 11 MMOL/L — SIGNIFICANT CHANGE UP (ref 7–14)
AST SERPL-CCNC: 10 U/L — SIGNIFICANT CHANGE UP (ref 0–41)
BILIRUB SERPL-MCNC: <0.2 MG/DL — SIGNIFICANT CHANGE UP (ref 0.2–1.2)
BUN SERPL-MCNC: 15 MG/DL — SIGNIFICANT CHANGE UP (ref 10–20)
CALCIUM SERPL-MCNC: 9 MG/DL — SIGNIFICANT CHANGE UP (ref 8.5–10.1)
CHLORIDE SERPL-SCNC: 105 MMOL/L — SIGNIFICANT CHANGE UP (ref 98–110)
CO2 SERPL-SCNC: 25 MMOL/L — SIGNIFICANT CHANGE UP (ref 17–32)
CREAT SERPL-MCNC: 0.7 MG/DL — SIGNIFICANT CHANGE UP (ref 0.7–1.5)
CRP SERPL-MCNC: 9 MG/L — HIGH
ERYTHROCYTE [SEDIMENTATION RATE] IN BLOOD: 39 MM/HR — HIGH (ref 0–10)
GLUCOSE SERPL-MCNC: 106 MG/DL — HIGH (ref 70–99)
HCT VFR BLD CALC: 36.8 % — LOW (ref 42–52)
HGB BLD-MCNC: 11.7 G/DL — LOW (ref 14–18)
MCHC RBC-ENTMCNC: 26.4 PG — LOW (ref 27–31)
MCHC RBC-ENTMCNC: 31.8 G/DL — LOW (ref 32–37)
MCV RBC AUTO: 82.9 FL — SIGNIFICANT CHANGE UP (ref 80–94)
NRBC # BLD: 0 /100 WBCS — SIGNIFICANT CHANGE UP (ref 0–0)
PLATELET # BLD AUTO: 266 K/UL — SIGNIFICANT CHANGE UP (ref 130–400)
POTASSIUM SERPL-MCNC: 4.8 MMOL/L — SIGNIFICANT CHANGE UP (ref 3.5–5)
POTASSIUM SERPL-SCNC: 4.8 MMOL/L — SIGNIFICANT CHANGE UP (ref 3.5–5)
PROT SERPL-MCNC: 6.8 G/DL — SIGNIFICANT CHANGE UP (ref 6–8)
RBC # BLD: 4.44 M/UL — LOW (ref 4.7–6.1)
RBC # FLD: 14.6 % — HIGH (ref 11.5–14.5)
SODIUM SERPL-SCNC: 141 MMOL/L — SIGNIFICANT CHANGE UP (ref 135–146)
VANCOMYCIN TROUGH SERPL-MCNC: 4.8 UG/ML — LOW (ref 5–10)
WBC # BLD: 9.48 K/UL — SIGNIFICANT CHANGE UP (ref 4.8–10.8)
WBC # FLD AUTO: 9.48 K/UL — SIGNIFICANT CHANGE UP (ref 4.8–10.8)

## 2021-10-27 PROCEDURE — 78803 RP LOCLZJ TUM SPECT 1 AREA: CPT | Mod: 26

## 2021-10-27 RX ORDER — VANCOMYCIN HCL 1 G
1750 VIAL (EA) INTRAVENOUS EVERY 12 HOURS
Refills: 0 | Status: DISCONTINUED | OUTPATIENT
Start: 2021-10-27 | End: 2021-10-28

## 2021-10-27 RX ADMIN — CEFTRIAXONE 100 MILLIGRAM(S): 500 INJECTION, POWDER, FOR SOLUTION INTRAMUSCULAR; INTRAVENOUS at 06:10

## 2021-10-27 RX ADMIN — OXYCODONE HYDROCHLORIDE 5 MILLIGRAM(S): 5 TABLET ORAL at 08:42

## 2021-10-27 RX ADMIN — ENOXAPARIN SODIUM 40 MILLIGRAM(S): 100 INJECTION SUBCUTANEOUS at 11:30

## 2021-10-27 RX ADMIN — Medication 1 TABLET(S): at 11:30

## 2021-10-27 RX ADMIN — Medication 300 MILLIGRAM(S): at 05:36

## 2021-10-27 NOTE — PROGRESS NOTE ADULT - ASSESSMENT
ASSESSMENT  26yMale presents with pain in Left leg. Has soft tissue infection with assocated abscess    IMPRESSION  #Left Lower Extremity soft tissue infection/possible OM in setting of intramedullary mp fixation   - s/p IMN mp fixation of femoral shaft fracture   - CT Lower Extremity w/ IV Cont, Left (10.20.21 @ 22:50): Lucency adjacent to the distal mp and screws is demonstrated at multiple levels. Cortical disruption is noted at multiple locations, most prominently in the distal posterior femoral shaft (series 7 image 391). Large, ill-defined abscess extending from the medullary canal of the left femur into the surrounding soft tissues including the lateral thigh musculature and subcutaneous tissues (series 7 image 381). The dominant subcutaneous collectionis noted lateral to the distal femoral shaft just above the knee measuring up to 9 x 2 enhanced centimeters.  - s/p I and D of left leg absces (about 6 x 6 cm in size)  -- Wound Cx with Enterococcus species   #Obesity BMI (kg/m2): 35, 35  #Abx allergy: NKDA      RECOMMENDATIONS  - can stop ceftriaxone  - continue vancomycin 1500 mg q 12 hours -- please check trough prior to next dose (goal 15-20)  - follow-up enterococcus speciation and suceptibilities   - will follow further OR plans   - local wound care     Please call or message on [employer] Teams if with any questions.  Spectra [phone]

## 2021-10-27 NOTE — PROGRESS NOTE ADULT - ASSESSMENT
ORTHOPEDIC SURGERY PROGRESS NOTE     25yo M w/ LLE abscess now s/p I&D with ortho/burn POD2    Patient seen and examined at bedside . The patient is awake and alert in NAD. No complaints of chest pain, SOB, N/V.    MEDICATIONS  (STANDING):  cefTRIAXone   IVPB 2000 milliGRAM(s) IV Intermittent every 24 hours  enoxaparin Injectable 40 milliGRAM(s) SubCutaneous daily  multivitamin 1 Tablet(s) Oral daily  vancomycin  IVPB 1500 milliGRAM(s) IV Intermittent every 12 hours    MEDICATIONS  (PRN):  acetaminophen     Tablet .. 650 milliGRAM(s) Oral every 6 hours PRN Temp greater or equal to 38C (100.4F), Mild Pain (1 - 3), Moderate Pain (4 - 6)  diphenhydrAMINE 25 milliGRAM(s) Oral at bedtime PRN Insomnia  magnesium hydroxide Suspension 30 milliLiter(s) Oral daily PRN Constipation  ondansetron Injectable 4 milliGRAM(s) IV Push every 6 hours PRN Nausea and/or Vomiting  oxyCODONE    IR 5 milliGRAM(s) Oral every 4 hours PRN Severe Pain (7 - 10)    ICU Vital Signs Last 24 Hrs  T(C): 36.2 (27 Oct 2021 04:52), Max: 37.6 (26 Oct 2021 17:00)  T(F): 97.2 (27 Oct 2021 04:52), Max: 99.6 (26 Oct 2021 17:00)  HR: 86 (27 Oct 2021 04:52) (76 - 93)  BP: 123/72 (27 Oct 2021 04:52) (118/58 - 129/59)  BP(mean): --  ABP: --  ABP(mean): --  RR: 20 (27 Oct 2021 04:52) (18 - 20)  SpO2: 99% (27 Oct 2021 04:52) (97% - 99%)      PE:   Dressing C/D/I   Compartments soft and compressible  Motor intact distally  SILT distally  CR<2sec  palpable pulses                             11.7   9.48  )-----------( 266      ( 27 Oct 2021 06:00 )             36.8     10-27    141  |  105  |  15  ----------------------------<  106<H>  4.8   |  25  |  0.7    Ca    9.0      27 Oct 2021 06:00    TPro  6.8  /  Alb  4.1  /  TBili  <0.2  /  DBili  x   /  AST  10  /  ALT  17  /  AlkPhos  106  10-27        25yo M w/ LLE abscess now s/p I&D with ortho/burn POD2. Doing well, pending final culture and sensitivities from intraop cultures  -WBAT LLE  -pain control  -DVT ppx  -follow up intra-op cultures  -ID consult follow up   -packing pulled from wound this AM   -appreciate burn recs for wound care management           ORTHOPEDIC SURGERY PROGRESS NOTE     27yo M w/ LLE abscess now s/p I&D with ortho/burn POD2    Patient seen and examined at bedside . The patient is awake and alert in NAD. No complaints of chest pain, SOB, N/V.    MEDICATIONS  (STANDING):  cefTRIAXone   IVPB 2000 milliGRAM(s) IV Intermittent every 24 hours  enoxaparin Injectable 40 milliGRAM(s) SubCutaneous daily  multivitamin 1 Tablet(s) Oral daily  vancomycin  IVPB 1500 milliGRAM(s) IV Intermittent every 12 hours    MEDICATIONS  (PRN):  acetaminophen     Tablet .. 650 milliGRAM(s) Oral every 6 hours PRN Temp greater or equal to 38C (100.4F), Mild Pain (1 - 3), Moderate Pain (4 - 6)  diphenhydrAMINE 25 milliGRAM(s) Oral at bedtime PRN Insomnia  magnesium hydroxide Suspension 30 milliLiter(s) Oral daily PRN Constipation  ondansetron Injectable 4 milliGRAM(s) IV Push every 6 hours PRN Nausea and/or Vomiting  oxyCODONE    IR 5 milliGRAM(s) Oral every 4 hours PRN Severe Pain (7 - 10)    ICU Vital Signs Last 24 Hrs  T(C): 36.2 (27 Oct 2021 04:52), Max: 37.6 (26 Oct 2021 17:00)  T(F): 97.2 (27 Oct 2021 04:52), Max: 99.6 (26 Oct 2021 17:00)  HR: 86 (27 Oct 2021 04:52) (76 - 93)  BP: 123/72 (27 Oct 2021 04:52) (118/58 - 129/59)  BP(mean): --  ABP: --  ABP(mean): --  RR: 20 (27 Oct 2021 04:52) (18 - 20)  SpO2: 99% (27 Oct 2021 04:52) (97% - 99%)      PE:   Dressing removed, packing in place, actively draining bloody pus; re-packed with sterile 1/2 inch packing   Compartments soft and compressible  Motor intact distally  SILT distally  CR<2sec  palpable pulses                             11.7   9.48  )-----------( 266      ( 27 Oct 2021 06:00 )             36.8     10-27    141  |  105  |  15  ----------------------------<  106<H>  4.8   |  25  |  0.7    Ca    9.0      27 Oct 2021 06:00    TPro  6.8  /  Alb  4.1  /  TBili  <0.2  /  DBili  x   /  AST  10  /  ALT  17  /  AlkPhos  106  10-27        27yo M w/ LLE abscess now s/p I&D with ortho/burn POD2. Doing well, pending final culture and sensitivities from intraop cultures  -WBAT LLE  -pain control  -DVT ppx  -follow up intra-op cultures  -ID consult follow up   -packing pulled from wound this AM; re-packed; will discuss with attending when to pull packing all together   -appreciate burn recs for wound care management

## 2021-10-27 NOTE — PROGRESS NOTE ADULT - SUBJECTIVE AND OBJECTIVE BOX
URBINAESPINAL, ABIMAEL  26y, Male  Allergy: No Known Allergies      LOS  2d    CHIEF COMPLAINT: Right knee pain (25 Oct 2021 13:03)      INTERVAL EVENTS/HPI  - No acute events overnight  - T(F): , Max: 99.6 (10-26-21 @ 17:00)  - Denies any worsening symptoms  - Tolerating medication  - WBC Count: 9.48 (10-27-21 @ 06:00)  WBC Count: 8.65 (10-24-21 @ 22:10)     - Creatinine, Serum: 0.7 (10-27-21 @ 06:00)       ROS  General: Denies rigors, nightsweats  HEENT: Denies headache, rhinorrhea, sore throat, eye pain  CV: Denies CP, palpitations  PULM: Denies wheezing, hemoptysis  GI: Denies hematemesis, hematochezia, melena  : Denies discharge, hematuria  MSK: Denies arthralgias, myalgias  SKIN: Denies rash, lesions  NEURO: Denies paresthesias, weakness  PSYCH: Denies depression, anxiety    VITALS:  T(F): 97.6, Max: 99.6 (10-26-21 @ 17:00)  HR: 92  BP: 131/61  RR: 18Vital Signs Last 24 Hrs  T(C): 36.4 (27 Oct 2021 09:38), Max: 37.6 (26 Oct 2021 17:00)  T(F): 97.6 (27 Oct 2021 09:38), Max: 99.6 (26 Oct 2021 17:00)  HR: 92 (27 Oct 2021 09:40) (76 - 107)  BP: 131/61 (27 Oct 2021 09:38) (118/58 - 131/61)  BP(mean): --  RR: 18 (27 Oct 2021 09:38) (18 - 20)  SpO2: 98% (27 Oct 2021 09:38) (97% - 99%)    PHYSICAL EXAM:  Gen: NAD, resting in bed  HEENT: Normocephalic, atraumatic  Neck: supple, no lymphadenopathy  CV: Regular rate & regular rhythm  Lungs: decreased BS at bases, no fremitus  Abdomen: Soft, BS present  Ext: left knee dressed  Neuro: non focal, awake  Skin: no rash, no erythema  Lines: no phlebitis    FH: Non-contributory  Social Hx: Non-contributory    TESTS & MEASUREMENTS:                        11.7   9.48  )-----------( 266      ( 27 Oct 2021 06:00 )             36.8     10-27    141  |  105  |  15  ----------------------------<  106<H>  4.8   |  25  |  0.7    Ca    9.0      27 Oct 2021 06:00    TPro  6.8  /  Alb  4.1  /  TBili  <0.2  /  DBili  x   /  AST  10  /  ALT  17  /  AlkPhos  106  10-27    eGFR if Non African American: 130 mL/min/1.73M2 (10-27-21 @ 06:00)  eGFR if : 151 mL/min/1.73M2 (10-27-21 @ 06:00)    LIVER FUNCTIONS - ( 27 Oct 2021 06:00 )  Alb: 4.1 g/dL / Pro: 6.8 g/dL / ALK PHOS: 106 U/L / ALT: 17 U/L / AST: 10 U/L / GGT: x               Culture - Other (collected 10-25-21 @ 20:29)  Source: .Other left knee  Preliminary Report (10-27-21 @ 04:18):    Moderate Enterococcus species    Culture - Acid Fast - Other w/Smear (collected 10-25-21 @ 19:59)  Source: .Other LEFT LEG    Culture - Fungal, Other (collected 10-25-21 @ 19:59)  Source: .Other LEFT LEG  Preliminary Report (10-27-21 @ 07:29):    Testing in progress    Culture - Abscess with Gram Stain (collected 10-25-21 @ 19:59)  Source: .Abscess LEFT LEG  Preliminary Report (10-27-21 @ 09:40):    Normal skin jas isolated    Culture - Fungal, Other (collected 10-25-21 @ 19:58)  Source: .Other LEFT LEG  Preliminary Report (10-27-21 @ 07:57):    Testing in progress    Culture - Acid Fast - Other w/Smear (collected 10-25-21 @ 19:58)  Source: .Other LEFT LEG    Culture - Blood (collected 10-20-21 @ 20:00)  Source: .Blood Blood-Peripheral  Final Report (10-26-21 @ 01:00):    No Growth Final    Culture - Blood (collected 10-20-21 @ 19:58)  Source: .Blood Blood-Peripheral  Final Report (10-26-21 @ 01:00):    No Growth Final        Lactate, Blood: 1.2 mmol/L (10-24-21 @ 22:10)      INFECTIOUS DISEASES TESTING  COVID-19 PCR: NotDetec (10-25-21 @ 00:55)  COVID-19 PCR: NotDetec (10-20-21 @ 17:58)      INFLAMMATORY MARKERS  Sedimentation Rate, Erythrocyte: 39 mm/Hr (10-27-21 @ 06:00)  Sedimentation Rate, Erythrocyte: 18 mm/Hr (10-24-21 @ 22:10)  C-Reactive Protein, Serum: 25 mg/L (10-24-21 @ 22:10)  Sedimentation Rate, Erythrocyte: 40 mm/Hr (10-21-21 @ 04:00)      RADIOLOGY & ADDITIONAL TESTS:  I have personally reviewed the last available Chest xray  CXR  Xray Chest 2 Views PA/Lat:   EXAM:  XR CHEST PA LAT 2V            PROCEDURE DATE:  10/24/2021            INTERPRETATION:  Clinical History / Reason for exam: Preoperative evaluation    Comparison : Chest radiograph April 17, 2016.    Technique/Positioning: PA and lateral viewsof the chest.    Findings:    Support devices: None.    Cardiac/mediastinum/hilum: Unremarkable.    Lung parenchyma/Pleura: No consolidation, effusion or pneumothorax.    Skeleton/soft tissues: Unchanged.    Impression:    No radiographic evidence ofacute cardiopulmonary disease.        --- End of Report ---              ELLIOT LANDAU MD; Attending Radiologist  This document has been electronically signed. Oct 25 2021  8:55AM (10-24-21 @ 22:01)      CT      CARDIOLOGY TESTING  12 Lead ECG:   Ventricular Rate 87 BPM    Atrial Rate 87 BPM    P-R Interval 140 ms    QRS Duration 90 ms    Q-T Interval 338 ms    QTC Calculation(Bazett) 406 ms    P Axis 51 degrees    R Axis 77 degrees    T Axis 5 degrees    Diagnosis Line Normal sinus rhythm  Nonspecific T wave abnormality  Abnormal ECG    Confirmed by ISABELLE CROCKETT MD ([id number]) on 10/24/2021 10:53:00 PM (10-24-21 @ 22:25)  12 Lead ECG:   Ventricular Rate 74 BPM    Atrial Rate 74 BPM    P-R Interval 156 ms    QRS Duration 98 ms    Q-T Interval 360 ms    QTC Calculation(Bazett) 399 ms    P Axis 41 degrees    R Axis 51 degrees    T Axis 10 degrees    Diagnosis Line Normal sinus rhythm  Normal ECG    Confirmed by BRETT QUIROZ MD ([id number]) on 10/21/2021 7:46:15 AM (10-21-21 @ 02:07)      MEDICATIONS  cefTRIAXone   IVPB 2000 IV Intermittent every 24 hours  enoxaparin Injectable 40 SubCutaneous daily  multivitamin 1 Oral daily  vancomycin  IVPB 1500 IV Intermittent every 12 hours      WEIGHT  Weight (kg): 107.5 (10-25-21 @ 18:51)  Creatinine, Serum: 0.7 mg/dL (10-27-21 @ 06:00)      ANTIBIOTICS:  cefTRIAXone   IVPB 2000 milliGRAM(s) IV Intermittent every 24 hours  vancomycin  IVPB 1500 milliGRAM(s) IV Intermittent every 12 hours      All available historical records have been reviewed

## 2021-10-28 LAB
-  AMPICILLIN: SIGNIFICANT CHANGE UP
-  TETRACYCLINE: SIGNIFICANT CHANGE UP
-  VANCOMYCIN: SIGNIFICANT CHANGE UP
CULTURE RESULTS: SIGNIFICANT CHANGE UP
METHOD TYPE: SIGNIFICANT CHANGE UP
ORGANISM # SPEC MICROSCOPIC CNT: SIGNIFICANT CHANGE UP
ORGANISM # SPEC MICROSCOPIC CNT: SIGNIFICANT CHANGE UP
SPECIMEN SOURCE: SIGNIFICANT CHANGE UP

## 2021-10-28 RX ORDER — CEFTRIAXONE 500 MG/1
2000 INJECTION, POWDER, FOR SOLUTION INTRAMUSCULAR; INTRAVENOUS ONCE
Refills: 0 | Status: COMPLETED | OUTPATIENT
Start: 2021-10-28 | End: 2021-10-28

## 2021-10-28 RX ORDER — CEFTRIAXONE 500 MG/1
2000 INJECTION, POWDER, FOR SOLUTION INTRAMUSCULAR; INTRAVENOUS EVERY 24 HOURS
Refills: 0 | Status: DISCONTINUED | OUTPATIENT
Start: 2021-10-29 | End: 2021-11-01

## 2021-10-28 RX ORDER — CEFTRIAXONE 500 MG/1
INJECTION, POWDER, FOR SOLUTION INTRAMUSCULAR; INTRAVENOUS
Refills: 0 | Status: DISCONTINUED | OUTPATIENT
Start: 2021-10-28 | End: 2021-11-01

## 2021-10-28 RX ORDER — AMPICILLIN TRIHYDRATE 250 MG
2 CAPSULE ORAL EVERY 4 HOURS
Refills: 0 | Status: DISCONTINUED | OUTPATIENT
Start: 2021-10-28 | End: 2021-11-01

## 2021-10-28 RX ADMIN — Medication 216 GRAM(S): at 21:13

## 2021-10-28 RX ADMIN — CEFTRIAXONE 100 MILLIGRAM(S): 500 INJECTION, POWDER, FOR SOLUTION INTRAMUSCULAR; INTRAVENOUS at 18:23

## 2021-10-28 RX ADMIN — Medication 1 TABLET(S): at 11:53

## 2021-10-28 RX ADMIN — Medication 216 GRAM(S): at 17:45

## 2021-10-28 RX ADMIN — Medication 250 MILLIGRAM(S): at 05:43

## 2021-10-28 RX ADMIN — ENOXAPARIN SODIUM 40 MILLIGRAM(S): 100 INJECTION SUBCUTANEOUS at 11:54

## 2021-10-28 NOTE — PROGRESS NOTE ADULT - SUBJECTIVE AND OBJECTIVE BOX
URBINAESPINALABIMAEL  26y, Male  Allergy: No Known Allergies      LOS  3d    CHIEF COMPLAINT: Right knee pain (25 Oct 2021 13:03)      INTERVAL EVENTS/HPI  - No acute events overnight  - T(F): , Max: 99.5 (10-27-21 @ 21:26)  - Denies any worsening symptoms  - Tolerating medication  - WBC Count: 9.48 (10-27-21 @ 06:00)     - Creatinine, Serum: 0.7 (10-27-21 @ 06:00)       ROS  General: Denies rigors, nightsweats  HEENT: Denies headache, rhinorrhea, sore throat, eye pain  CV: Denies CP, palpitations  PULM: Denies wheezing, hemoptysis  GI: Denies hematemesis, hematochezia, melena  : Denies discharge, hematuria  MSK: Denies arthralgias, myalgias  SKIN: Denies rash, lesions  NEURO: Denies paresthesias, weakness  PSYCH: Denies depression, anxiety    VITALS:  T(F): 98.4, Max: 99.5 (10-27-21 @ 21:26)  HR: 85  BP: 124/63  RR: 18Vital Signs Last 24 Hrs  T(C): 36.9 (28 Oct 2021 13:06), Max: 37.5 (27 Oct 2021 21:26)  T(F): 98.4 (28 Oct 2021 13:06), Max: 99.5 (27 Oct 2021 21:26)  HR: 85 (28 Oct 2021 13:06) (75 - 91)  BP: 124/63 (28 Oct 2021 13:06) (107/61 - 126/73)  BP(mean): --  RR: 18 (28 Oct 2021 13:06) (18 - 18)  SpO2: 99% (28 Oct 2021 13:06) (98% - 99%)    PHYSICAL EXAM:  Gen: NAD, resting in bed  HEENT: Normocephalic, atraumatic  Neck: supple, no lymphadenopathy  CV: Regular rate & regular rhythm  Lungs: decreased BS at bases, no fremitus  Abdomen: Soft, BS present  Ext: left lower extremity dressed - wound packed   Neuro: non focal, awake  Skin: no rash, no erythema  Lines: no phlebitis    FH: Non-contributory  Social Hx: Non-contributory    TESTS & MEASUREMENTS:                        11.7   9.48  )-----------( 266      ( 27 Oct 2021 06:00 )             36.8     10-27    141  |  105  |  15  ----------------------------<  106<H>  4.8   |  25  |  0.7    Ca    9.0      27 Oct 2021 06:00    TPro  6.8  /  Alb  4.1  /  TBili  <0.2  /  DBili  x   /  AST  10  /  ALT  17  /  AlkPhos  106  10-27      LIVER FUNCTIONS - ( 27 Oct 2021 06:00 )  Alb: 4.1 g/dL / Pro: 6.8 g/dL / ALK PHOS: 106 U/L / ALT: 17 U/L / AST: 10 U/L / GGT: x               Culture - Other (collected 10-25-21 @ 20:29)  Source: .Other left knee  Final Report (10-28-21 @ 12:50):    Moderate Enterococcus faecalis  Organism: Enterococcus faecalis (10-28-21 @ 12:50)  Organism: Enterococcus faecalis (10-28-21 @ 12:50)      -  Ampicillin: S <=2 Predicts results to ampicillin/sulbactam, amoxacillin-clavulanate and  piperacillin-tazobactam.      -  Tetra/Doxy: S <=4      -  Vancomycin: S 2      Method Type: GARFIELD    Culture - Acid Fast - Other w/Smear (collected 10-25-21 @ 19:59)  Source: .Other LEFT LEG  Preliminary Report (10-27-21 @ 15:04):    Culture is being performed.    Culture - Fungal, Other (collected 10-25-21 @ 19:59)  Source: .Other LEFT LEG  Preliminary Report (10-27-21 @ 07:29):    Testing in progress    Culture - Abscess with Gram Stain (collected 10-25-21 @ 19:59)  Source: .Abscess LEFT LEG  Preliminary Report (10-27-21 @ 09:40):    Normal skin jas isolated    Culture - Fungal, Other (collected 10-25-21 @ 19:58)  Source: .Other LEFT LEG  Preliminary Report (10-27-21 @ 07:57):    Testing in progress    Culture - Acid Fast - Other w/Smear (collected 10-25-21 @ 19:58)  Source: .Other LEFT LEG  Preliminary Report (10-27-21 @ 15:04):    Culture is being performed.    Culture - Abscess with Gram Stain (collected 10-25-21 @ 19:58)  Source: .Abscess LEFT LEG  Preliminary Report (10-27-21 @ 13:04):    Few Enterococcus faecalis    Culture - Blood (collected 10-20-21 @ 20:00)  Source: .Blood Blood-Peripheral  Final Report (10-26-21 @ 01:00):    No Growth Final    Culture - Blood (collected 10-20-21 @ 19:58)  Source: .Blood Blood-Peripheral  Final Report (10-26-21 @ 01:00):    No Growth Final        Lactate, Blood: 1.2 mmol/L (10-24-21 @ 22:10)      INFECTIOUS DISEASES TESTING  COVID-19 PCR: NotDetec (10-25-21 @ 00:55)  COVID-19 PCR: NotDetec (10-20-21 @ 17:58)      INFLAMMATORY MARKERS  Sedimentation Rate, Erythrocyte: 39 mm/Hr (10-27-21 @ 06:00)  C-Reactive Protein, Serum: 9 mg/L (10-27-21 @ 06:00)  Sedimentation Rate, Erythrocyte: 18 mm/Hr (10-24-21 @ 22:10)  C-Reactive Protein, Serum: 25 mg/L (10-24-21 @ 22:10)      RADIOLOGY & ADDITIONAL TESTS:  I have personally reviewed the last available Chest xray  CXR      CT      CARDIOLOGY TESTING  12 Lead ECG:   Ventricular Rate 87 BPM    Atrial Rate 87 BPM    P-R Interval 140 ms    QRS Duration 90 ms    Q-T Interval 338 ms    QTC Calculation(Bazett) 406 ms    P Axis 51 degrees    R Axis 77 degrees    T Axis 5 degrees    Diagnosis Line Normal sinus rhythm  Nonspecific T wave abnormality  Abnormal ECG    Confirmed by ISABELLE CROCKETT MD ([id number]) on 10/24/2021 10:53:00 PM (10-24-21 @ 22:25)  12 Lead ECG:   Ventricular Rate 74 BPM    Atrial Rate 74 BPM    P-R Interval 156 ms    QRS Duration 98 ms    Q-T Interval 360 ms    QTC Calculation(Bazett) 399 ms    P Axis 41 degrees    R Axis 51 degrees    T Axis 10 degrees    Diagnosis Line Normal sinus rhythm  Normal ECG    Confirmed by BRETT QUIROZ MD ([id number]) on 10/21/2021 7:46:15 AM (10-21-21 @ 02:07)      MEDICATIONS  ampicillin  IVPB 2 IV Intermittent every 4 hours  enoxaparin Injectable 40 SubCutaneous daily  multivitamin 1 Oral daily      WEIGHT  Weight (kg): 107.5 (10-25-21 @ 18:51)      ANTIBIOTICS:  ampicillin  IVPB 2 Gram(s) IV Intermittent every 4 hours      All available historical records have been reviewed

## 2021-10-28 NOTE — CONSULT NOTE ADULT - ATTENDING COMMENTS
large dressing change--. left leg wound granulating--. local wound care, iv abx
pt seen introp--> necrotic tissue left leg--> debridee--> local wound care

## 2021-10-28 NOTE — PROGRESS NOTE ADULT - SUBJECTIVE AND OBJECTIVE BOX
ORTHOPEDIC SURGERY PROGRESS NOTE           27yo M w/ LLE abscess now s/p I&D with ortho/burn POD3          Patient seen and examined at bedside . The patient is awake and alert in NAD. No complaints of chest pain, SOB, N/V.          MEDICATIONS  (STANDING):     enoxaparin Injectable 40 milliGRAM(s) SubCutaneous daily     multivitamin 1 Tablet(s) Oral daily     vancomycin  IVPB 1750 milliGRAM(s) IV Intermittent every 12 hours          MEDICATIONS  (PRN):     acetaminophen     Tablet .. 650 milliGRAM(s) Oral every 6 hours PRN Temp greater or equal to 38C (100.4F), Mild Pain (1 - 3), Moderate Pain (4 - 6)     diphenhydrAMINE 25 milliGRAM(s) Oral at bedtime PRN Insomnia     magnesium hydroxide Suspension 30 milliLiter(s) Oral daily PRN Constipation     ondansetron Injectable 4 milliGRAM(s) IV Push every 6 hours PRN Nausea and/or Vomiting     oxyCODONE    IR 5 milliGRAM(s) Oral every 4 hours PRN Severe Pain (7 - 10)          ICU Vital Signs Last 24 Hrs     T(C): 36.4 (28 Oct 2021 08:52), Max: 37.5 (27 Oct 2021 17:01)     T(F): 97.5 (28 Oct 2021 08:52), Max: 99.5 (27 Oct 2021 17:01)     HR: 75 (28 Oct 2021 08:52) (75 - 106)     BP: 126/73 (28 Oct 2021 08:52) (107/61 - 138/78)     BP(mean): --     ABP: --     ABP(mean): --     RR: 18 (28 Oct 2021 08:52) (18 - 18)     SpO2: 99% (28 Oct 2021 08:52) (98% - 99%)               PE:      Dressing removed; medial incision c/d/i sutures in place, lateral abscess incision packing in place, actively draining bloody pus; sutures popped out; removed from skin; re-packed with sterile 1/2 inch packing 4x4 webril ACE and knee immobilizer     Compartments soft and compressible     Motor intact distally     SILT distally     CR<2sec     palpable pulses                < from: NM SPECT Bone Scan, Single Area Single Day (10.27.21 @ 15:56) >     Impression:     1.  Increased arterial flow, venous phase activity, blood pool activity and delayed bone agent uptake in the distal 1/3 of the left femoral diaphysis and distal left femoral metaphysis consistent in light of the history with osteomyelitis.     2.  Less intensely increased activity in the proximal and mid shaft of the left femur with no increased flow or blood pool activity consistent with continued healing associated with intramedullary mp     < end of copied text >               27yo M w/ LLE abscess now s/p I&D with ortho/burn POD3. Doing well, pending final culture and sensitivities from intraop cultures. 3 phase bone scan done yesterday showing osteomyelitis of the distal left femur     -WBAT LLE with knee immobilizer      -pain control     -DVT ppx     -follow up intra-op cultures     -ID consult follow up; will need long term abx for osteomyelitis      -sutures removed from lateral knee after popped out on their own; re-packed and placed in knee immobilizer.      -will follow with daily dressing changes      -patient advised to remain in knee immobilizer at all times to prevent further opening of lateral incision     -Discussed with patient that due to the diagnosis of osteomyelitis he will require a PICC line and long term antibiotics and that in order to begin setting this up for his home infusions we must wait for the cultures to come back and ID recommendations Discussed with patient as well that he will require surgery to remove the IMN at some point, this could be done as an outpatient but may happen as an inpatient.

## 2021-10-28 NOTE — CONSULT NOTE ADULT - TIME BILLING
wound care
wound care
I have personally seen and examined this patient.    I have reviewed all pertinent clinical information and reviewed all relevant imaging and diagnostic studies personally.   I counseled the patient about diagnostic testing and treatment plan. All questions were answered.   I discussed recommendations with the primary team.

## 2021-10-28 NOTE — CONSULT NOTE ADULT - SUBJECTIVE AND OBJECTIVE BOX
Pt is 25yo Male with PMhx of RLE fx,  s/p IMN mp fixation of femoral shaft fracture w. dr. Norris in 2016, presented with Left knee abscess, s/p LLE abscess  I&D with ortho on 10/25, 3 phase bone scan showing osteomyelitis of the distal left femur. [facility] consult requested for wound care.      Vital Signs Last 24 Hrs  T(C): 36.9 (28 Oct 2021 13:06), Max: 37.5 (27 Oct 2021 21:26)  T(F): 98.4 (28 Oct 2021 13:06), Max: 99.5 (27 Oct 2021 21:26)  HR: 85 (28 Oct 2021 13:06) (75 - 91)  BP: 124/63 (28 Oct 2021 13:06) (107/61 - 126/73)  RR: 18 (28 Oct 2021 13:06) (18 - 18)  SpO2: 99% (28 Oct 2021 13:06) (98% - 99%)    I&O's Summary    27 Oct 2021 07:01  -  28 Oct 2021 07:00  --------------------------------------------------------  IN: 0 mL / OUT: 700 mL / NET: -700 mL    Allergies  No Known Allergies    Lab Results:                        11.7   9.48  )-----------( 266      ( 27 Oct 2021 06:00 )             36.8     MEDICATIONS  (STANDING):  ampicillin  IVPB 2 Gram(s) IV Intermittent every 4 hours  enoxaparin Injectable 40 milliGRAM(s) SubCutaneous daily  multivitamin 1 Tablet(s) Oral daily    MEDICATIONS  (PRN):  acetaminophen     Tablet .. 650 milliGRAM(s) Oral every 6 hours PRN Temp greater or equal to 38C (100.4F), Mild Pain (1 - 3), Moderate Pain (4 - 6)  diphenhydrAMINE 25 milliGRAM(s) Oral at bedtime PRN Insomnia  magnesium hydroxide Suspension 30 milliLiter(s) Oral daily PRN Constipation  ondansetron Injectable 4 milliGRAM(s) IV Push every 6 hours PRN Nausea and/or Vomiting  oxyCODONE    IR 5 milliGRAM(s) Oral every 4 hours PRN Severe Pain (7 - 10)      PHYSICAL EXAM:  GENERAL: well built, well nourished, alert, and oriented, cooperative, not in distress  Wound: Left knee:  Dressing removed; medial incision c/d/i sutures in place, lateral abscess incision  cleaned and packed with xeroform, covered with ABD, and wrapped with Kerlix and ACE wrap.

## 2021-10-28 NOTE — CONSULT NOTE ADULT - ASSESSMENT
Pt is 27yo Male with PMhx of RLE fx,  s/p IMN mp fixation of femoral shaft fracture w. dr. Norris in 2016, presented with Left knee abscess, s/p LLE abscess  I&D with ortho on 10/25, 3 phase bone scan showing osteomyelitis of the distal left femur.  [facility] consult requested for wound care.      - continue wound care two times a day: wash wound with soap and water, then pack with xeroform, apply ABD pads, and wrap with kelrix, and ACE wrap.  - IV abx as per ID recommendations  - rest of care as per primary team  - follow up in [facility] in one week after discharge

## 2021-10-28 NOTE — PROGRESS NOTE ADULT - ASSESSMENT
ASSESSMENT  26yMale presents with pain in Left leg. Has soft tissue infection with assocated abscess    IMPRESSION  #Left Lower Extremity soft tissue infection/possible OM in setting of intramedullary mp fixation   - s/p IMN mp fixation of femoral shaft fracture   - CT Lower Extremity w/ IV Cont, Left (10.20.21 @ 22:50): Lucency adjacent to the distal mp and screws is demonstrated at multiple levels. Cortical disruption is noted at multiple locations, most prominently in the distal posterior femoral shaft (series 7 image 391). Large, ill-defined abscess extending from the medullary canal of the left femur into the surrounding soft tissues including the lateral thigh musculature and subcutaneous tissues (series 7 image 381). The dominant subcutaneous collectionis noted lateral to the distal femoral shaft just above the knee measuring up to 9 x 2 enhanced centimeters.  - s/p I and D of left leg absces (about 6 x 6 cm in size)  -- Wound Cx with Enterococcus species   #Obesity BMI (kg/m2): 35, 35  #Abx allergy: NKDA      RECOMMENDATIONS  - start ampicillin 2g q 4 hours  - since enterococcus infection with retained hardware, also add ceftriaxone 2g q 24 hours  - will discuss with patient and infusion service if able to give ampicillin 12g q 24 hours as continuous infusion along with ceftriaxone 2g q 24 hours  - will need at least 6 weeks  - local wound care     Please call or message on [employer] Teams if with any questions.  Spectra [phone]

## 2021-10-29 PROCEDURE — 36573 INSJ PICC RS&I 5 YR+: CPT

## 2021-10-29 RX ADMIN — Medication 216 GRAM(S): at 21:13

## 2021-10-29 RX ADMIN — ENOXAPARIN SODIUM 40 MILLIGRAM(S): 100 INJECTION SUBCUTANEOUS at 11:18

## 2021-10-29 RX ADMIN — Medication 216 GRAM(S): at 11:18

## 2021-10-29 RX ADMIN — Medication 216 GRAM(S): at 02:11

## 2021-10-29 RX ADMIN — Medication 216 GRAM(S): at 17:41

## 2021-10-29 RX ADMIN — Medication 216 GRAM(S): at 13:16

## 2021-10-29 RX ADMIN — Medication 1 TABLET(S): at 11:19

## 2021-10-29 RX ADMIN — OXYCODONE HYDROCHLORIDE 5 MILLIGRAM(S): 5 TABLET ORAL at 13:22

## 2021-10-29 RX ADMIN — CEFTRIAXONE 100 MILLIGRAM(S): 500 INJECTION, POWDER, FOR SOLUTION INTRAMUSCULAR; INTRAVENOUS at 17:41

## 2021-10-29 RX ADMIN — OXYCODONE HYDROCHLORIDE 5 MILLIGRAM(S): 5 TABLET ORAL at 13:52

## 2021-10-29 RX ADMIN — OXYCODONE HYDROCHLORIDE 5 MILLIGRAM(S): 5 TABLET ORAL at 08:41

## 2021-10-29 RX ADMIN — Medication 216 GRAM(S): at 06:17

## 2021-10-29 NOTE — PROGRESS NOTE ADULT - ASSESSMENT
ASSESSMENT  26yMale presents with pain in Left leg. Has soft tissue infection with assocated abscess    IMPRESSION  #Left Lower Extremity soft tissue infection/possible OM in setting of intramedullary mp fixation   - s/p IMN mp fixation of femoral shaft fracture   - CT Lower Extremity w/ IV Cont, Left (10.20.21 @ 22:50): Lucency adjacent to the distal mp and screws is demonstrated at multiple levels. Cortical disruption is noted at multiple locations, most prominently in the distal posterior femoral shaft (series 7 image 391). Large, ill-defined abscess extending from the medullary canal of the left femur into the surrounding soft tissues including the lateral thigh musculature and subcutaneous tissues (series 7 image 381). The dominant subcutaneous collectionis noted lateral to the distal femoral shaft just above the knee measuring up to 9 x 2 enhanced centimeters.  - s/p I and D of left leg absces (about 6 x 6 cm in size)  -- Wound Cx with Enterococcus species   #Obesity BMI (kg/m2): 35, 35  #Abx allergy: NKDA      RECOMMENDATIONS  - ampicillin 2g q 4 hours and ceftriaxone 2g q 24 hours (for retained hardware)  - will need at least until potential hardware removal and then prolonged course after surgery   - on discharge -- Ampicillin 12 g q 24 hours (continuous infections) and Ceftriaxone 2g q 24 hours - can plan for 6 weeks from drainage of abscess for now (end date 12/6)    - Weekly CBC, CMP, ESR/CRP  - ID follow-up with Dr. Mikhail Ochoa for Telehealth. We will call the patient between 10:30-1:30      [address]       [phone]       Fax [phone]    Please call or message on [employer] Teams if with any questions.  Spectra [phone]

## 2021-10-29 NOTE — PROGRESS NOTE ADULT - SUBJECTIVE AND OBJECTIVE BOX
URBINAESPINALABIMAEL  26y, Male  Allergy: No Known Allergies      LOS  4d    CHIEF COMPLAINT: Right knee pain (25 Oct 2021 13:03)      INTERVAL EVENTS/HPI  - No acute events overnight  - T(F): , Max: 98.2 (10-29-21 @ 04:28)  - Denies any worsening symptoms  - Tolerating medication  - WBC Count: 9.48 (10-27-21 @ 06:00)     -      ROS  General: Denies rigors, nightsweats  HEENT: Denies headache, rhinorrhea, sore throat, eye pain  CV: Denies CP, palpitations  PULM: Denies wheezing, hemoptysis  GI: Denies hematemesis, hematochezia, melena  : Denies discharge, hematuria  MSK: Denies arthralgias, myalgias  SKIN: Denies rash, lesions  NEURO: Denies paresthesias, weakness  PSYCH: Denies depression, anxiety    VITALS:  T(F): 97.6, Max: 98.2 (10-29-21 @ 04:28)  HR: 79  BP: 126/64  RR: 20Vital Signs Last 24 Hrs  T(C): 36.4 (29 Oct 2021 13:12), Max: 36.8 (29 Oct 2021 04:28)  T(F): 97.6 (29 Oct 2021 13:12), Max: 98.2 (29 Oct 2021 04:28)  HR: 79 (29 Oct 2021 13:12) (79 - 92)  BP: 126/64 (29 Oct 2021 13:12) (104/57 - 126/64)  BP(mean): --  RR: 20 (29 Oct 2021 13:12) (18 - 20)  SpO2: 98% (29 Oct 2021 13:12) (96% - 98%)    PHYSICAL EXAM:  Gen: NAD, resting in bed  HEENT: Normocephalic, atraumatic  Neck: supple, no lymphadenopathy  CV: Regular rate & regular rhythm  Lungs: decreased BS at bases, no fremitus  Abdomen: Soft, BS present  Ext: Warm, well perfused  Neuro: non focal, awake  Skin: no rash, no erythema  Lines: no phlebitis    FH: Non-contributory  Social Hx: Non-contributory    TESTS & MEASUREMENTS:                  Culture - Other (collected 10-25-21 @ 20:29)  Source: .Other left knee  Final Report (10-28-21 @ 12:50):    Moderate Enterococcus faecalis  Organism: Enterococcus faecalis (10-28-21 @ 12:50)  Organism: Enterococcus faecalis (10-28-21 @ 12:50)      -  Ampicillin: S <=2 Predicts results to ampicillin/sulbactam, amoxacillin-clavulanate and  piperacillin-tazobactam.      -  Tetra/Doxy: S <=4      -  Vancomycin: S 2      Method Type: GARFIELD    Culture - Acid Fast - Other w/Smear (collected 10-25-21 @ 19:59)  Source: .Other LEFT LEG  Preliminary Report (10-27-21 @ 15:04):    Culture is being performed.    Culture - Fungal, Other (collected 10-25-21 @ 19:59)  Source: .Other LEFT LEG  Preliminary Report (10-27-21 @ 07:29):    Testing in progress    Culture - Abscess with Gram Stain (collected 10-25-21 @ 19:59)  Source: .Abscess LEFT LEG  Preliminary Report (10-27-21 @ 09:40):    Normal skin jas isolated    Culture - Fungal, Other (collected 10-25-21 @ 19:58)  Source: .Other LEFT LEG  Preliminary Report (10-27-21 @ 07:57):    Testing in progress    Culture - Acid Fast - Other w/Smear (collected 10-25-21 @ 19:58)  Source: .Other LEFT LEG  Preliminary Report (10-27-21 @ 15:04):    Culture is being performed.    Culture - Abscess with Gram Stain (collected 10-25-21 @ 19:58)  Source: .Abscess LEFT LEG  Preliminary Report (10-27-21 @ 13:04):    Few Enterococcus faecalis  Organism: Enterococcus faecalis (10-29-21 @ 09:44)  Organism: Enterococcus faecalis (10-29-21 @ 09:44)      -  Ampicillin: S <=2 Predicts results to ampicillin/sulbactam, amoxacillin-clavulanate and  piperacillin-tazobactam.      -  Tetra/Doxy: S <=1      -  Vancomycin: S 2      Method Type: GARFIELD    Culture - Blood (collected 10-20-21 @ 20:00)  Source: .Blood Blood-Peripheral  Final Report (10-26-21 @ 01:00):    No Growth Final    Culture - Blood (collected 10-20-21 @ 19:58)  Source: .Blood Blood-Peripheral  Final Report (10-26-21 @ 01:00):    No Growth Final        Lactate, Blood: 1.2 mmol/L (10-24-21 @ 22:10)      INFECTIOUS DISEASES TESTING  COVID-19 PCR: NotDetec (10-25-21 @ 00:55)  COVID-19 PCR: NotDetec (10-20-21 @ 17:58)      INFLAMMATORY MARKERS  Sedimentation Rate, Erythrocyte: 39 mm/Hr (10-27-21 @ 06:00)  C-Reactive Protein, Serum: 9 mg/L (10-27-21 @ 06:00)  Sedimentation Rate, Erythrocyte: 18 mm/Hr (10-24-21 @ 22:10)  C-Reactive Protein, Serum: 25 mg/L (10-24-21 @ 22:10)      RADIOLOGY & ADDITIONAL TESTS:  I have personally reviewed the last available Chest xray  CXR      CT      CARDIOLOGY TESTING  12 Lead ECG:   Ventricular Rate 87 BPM    Atrial Rate 87 BPM    P-R Interval 140 ms    QRS Duration 90 ms    Q-T Interval 338 ms    QTC Calculation(Bazett) 406 ms    P Axis 51 degrees    R Axis 77 degrees    T Axis 5 degrees    Diagnosis Line Normal sinus rhythm  Nonspecific T wave abnormality  Abnormal ECG    Confirmed by ISABELLE CROCKETT MD ([id number]) on 10/24/2021 10:53:00 PM (10-24-21 @ 22:25)  12 Lead ECG:   Ventricular Rate 74 BPM    Atrial Rate 74 BPM    P-R Interval 156 ms    QRS Duration 98 ms    Q-T Interval 360 ms    QTC Calculation(Bazett) 399 ms    P Axis 41 degrees    R Axis 51 degrees    T Axis 10 degrees    Diagnosis Line Normal sinus rhythm  Normal ECG    Confirmed by BRETT QUIROZ MD ([id number]) on 10/21/2021 7:46:15 AM (10-21-21 @ 02:07)      MEDICATIONS  ampicillin  IVPB 2 IV Intermittent every 4 hours  cefTRIAXone   IVPB     cefTRIAXone   IVPB 2000 IV Intermittent every 24 hours  enoxaparin Injectable 40 SubCutaneous daily  multivitamin 1 Oral daily      WEIGHT  Weight (kg): 107.5 (10-25-21 @ 18:51)      ANTIBIOTICS:  ampicillin  IVPB 2 Gram(s) IV Intermittent every 4 hours  cefTRIAXone   IVPB      cefTRIAXone   IVPB 2000 milliGRAM(s) IV Intermittent every 24 hours      All available historical records have been reviewed

## 2021-10-30 RX ADMIN — CEFTRIAXONE 100 MILLIGRAM(S): 500 INJECTION, POWDER, FOR SOLUTION INTRAMUSCULAR; INTRAVENOUS at 18:23

## 2021-10-30 RX ADMIN — Medication 216 GRAM(S): at 15:52

## 2021-10-30 RX ADMIN — Medication 1 TABLET(S): at 11:39

## 2021-10-30 RX ADMIN — Medication 216 GRAM(S): at 21:45

## 2021-10-30 RX ADMIN — Medication 216 GRAM(S): at 02:08

## 2021-10-30 RX ADMIN — Medication 216 GRAM(S): at 05:31

## 2021-10-30 RX ADMIN — ENOXAPARIN SODIUM 40 MILLIGRAM(S): 100 INJECTION SUBCUTANEOUS at 11:38

## 2021-10-30 RX ADMIN — Medication 216 GRAM(S): at 11:50

## 2021-10-30 NOTE — PROGRESS NOTE ADULT - SUBJECTIVE AND OBJECTIVE BOX
ORTHOPAEDIC SURGERY UPDATE NOTE    Upon review of imaging and return of labs (ESR 40, WBC 9.34), minimal pain, lack of draining, stable labs and full ROM of knee, recommend discharge to home with outpatient follow up for suspected chronic infection. Discussed importance of obtaining scheduled MRI next week and following up in office, for likely elective removal of hardware, irrigation and debridement. Recommend:  -WBAT LLE  -RTC if develops persistent fever/chills, cp/sob, n/v, numbness/tingling, drainage  -please prescribe 10d bactrim BID in setting of suspected infection    Case discussed with and plan per attending on call, Dr. Myers. 
ORTHOPEDIC SURGERY PROGRESS NOTE           27yo M w/ LLE abscess now s/p I&D with ortho/burn POD4         Patient seen and examined at bedside . The patient is awake and alert in NAD. No complaints of chest pain, SOB, N/V.          MEDICATIONS  (STANDING):     enoxaparin Injectable 40 milliGRAM(s) SubCutaneous daily     multivitamin 1 Tablet(s) Oral daily     vancomycin  IVPB 1750 milliGRAM(s) IV Intermittent every 12 hours          MEDICATIONS  (PRN):     acetaminophen     Tablet .. 650 milliGRAM(s) Oral every 6 hours PRN Temp greater or equal to 38C (100.4F), Mild Pain (1 - 3), Moderate Pain (4 - 6)     diphenhydrAMINE 25 milliGRAM(s) Oral at bedtime PRN Insomnia     magnesium hydroxide Suspension 30 milliLiter(s) Oral daily PRN Constipation     ondansetron Injectable 4 milliGRAM(s) IV Push every 6 hours PRN Nausea and/or Vomiting     oxyCODONE    IR 5 milliGRAM(s) Oral every 4 hours PRN Severe Pain (7 - 10)          ICU Vital Signs Last 24 Hrs     T(C): 36.4 (28 Oct 2021 08:52), Max: 37.5 (27 Oct 2021 17:01)     T(F): 97.5 (28 Oct 2021 08:52), Max: 99.5 (27 Oct 2021 17:01)     HR: 75 (28 Oct 2021 08:52) (75 - 106)     BP: 126/73 (28 Oct 2021 08:52) (107/61 - 138/78)     BP(mean): --     ABP: --     ABP(mean): --     RR: 18 (28 Oct 2021 08:52) (18 - 18)     SpO2: 99% (28 Oct 2021 08:52) (98% - 99%)               PE:      Dressing removed; medial incision c/d/i sutures in place, lateral abscess incision packing in place, actively draining bloody pus; sutures popped out; removed from skin; re-packed with sterile 1/2 inch packing 4x4 webril ACE and knee immobilizer     Compartments soft and compressible     Motor intact distally     SILT distally     CR<2sec     palpable pulses                  27yo M w/ LLE abscess now s/p I&D with ortho/burn POD4. Doing well, pending final culture and sensitivities from intraop cultures. 3 phase bone scan done yesterday showing osteomyelitis of the distal left femur     -WBAT LLE with knee immobilizer      -pain control     -DVT ppx     -follow up intra-op cultures     -ID consult follow up; will need long term abx for osteomyelitis      -sutures removed from lateral knee after popped out on their own; re-packed and placed in knee immobilizer.      -will follow with daily dressing changes      -patient advised to remain in knee immobilizer at all times to prevent further opening of lateral incision     -Discussed with patient that due to the diagnosis of osteomyelitis he will require a PICC line and long term antibiotics and that in order to begin setting this up for his home infusions we must wait for the cultures to come back and ID recommendations Discussed with patient as well that he will require surgery to remove the IMN at some point, this could be done as an outpatient but may happen as an inpatient.

## 2021-10-31 LAB
24R-OH-CALCIDIOL SERPL-MCNC: 24 NG/ML — LOW (ref 30–80)
ALBUMIN SERPL ELPH-MCNC: 4.3 G/DL — SIGNIFICANT CHANGE UP (ref 3.5–5.2)
ALP SERPL-CCNC: 131 U/L — HIGH (ref 30–115)
ALT FLD-CCNC: 26 U/L — SIGNIFICANT CHANGE UP (ref 0–41)
ANION GAP SERPL CALC-SCNC: 11 MMOL/L — SIGNIFICANT CHANGE UP (ref 7–14)
APTT BLD: 37.5 SEC — SIGNIFICANT CHANGE UP (ref 27–39.2)
AST SERPL-CCNC: 15 U/L — SIGNIFICANT CHANGE UP (ref 0–41)
BASOPHILS # BLD AUTO: 0.03 K/UL — SIGNIFICANT CHANGE UP (ref 0–0.2)
BASOPHILS NFR BLD AUTO: 0.4 % — SIGNIFICANT CHANGE UP (ref 0–1)
BILIRUB SERPL-MCNC: 0.2 MG/DL — SIGNIFICANT CHANGE UP (ref 0.2–1.2)
BLD GP AB SCN SERPL QL: SIGNIFICANT CHANGE UP
BUN SERPL-MCNC: 13 MG/DL — SIGNIFICANT CHANGE UP (ref 10–20)
CALCIUM SERPL-MCNC: 9.4 MG/DL — SIGNIFICANT CHANGE UP (ref 8.5–10.1)
CHLORIDE SERPL-SCNC: 104 MMOL/L — SIGNIFICANT CHANGE UP (ref 98–110)
CO2 SERPL-SCNC: 24 MMOL/L — SIGNIFICANT CHANGE UP (ref 17–32)
CREAT SERPL-MCNC: 0.7 MG/DL — SIGNIFICANT CHANGE UP (ref 0.7–1.5)
EOSINOPHIL # BLD AUTO: 0.14 K/UL — SIGNIFICANT CHANGE UP (ref 0–0.7)
EOSINOPHIL NFR BLD AUTO: 2.1 % — SIGNIFICANT CHANGE UP (ref 0–8)
GLUCOSE SERPL-MCNC: 98 MG/DL — SIGNIFICANT CHANGE UP (ref 70–99)
HCT VFR BLD CALC: 40 % — LOW (ref 42–52)
HGB BLD-MCNC: 12.8 G/DL — LOW (ref 14–18)
IMM GRANULOCYTES NFR BLD AUTO: 0.3 % — SIGNIFICANT CHANGE UP (ref 0.1–0.3)
INR BLD: 1.09 RATIO — SIGNIFICANT CHANGE UP (ref 0.65–1.3)
LYMPHOCYTES # BLD AUTO: 2.17 K/UL — SIGNIFICANT CHANGE UP (ref 1.2–3.4)
LYMPHOCYTES # BLD AUTO: 32.4 % — SIGNIFICANT CHANGE UP (ref 20.5–51.1)
MAGNESIUM SERPL-MCNC: 2.1 MG/DL — SIGNIFICANT CHANGE UP (ref 1.8–2.4)
MCHC RBC-ENTMCNC: 26.4 PG — LOW (ref 27–31)
MCHC RBC-ENTMCNC: 32 G/DL — SIGNIFICANT CHANGE UP (ref 32–37)
MCV RBC AUTO: 82.5 FL — SIGNIFICANT CHANGE UP (ref 80–94)
MONOCYTES # BLD AUTO: 0.55 K/UL — SIGNIFICANT CHANGE UP (ref 0.1–0.6)
MONOCYTES NFR BLD AUTO: 8.2 % — SIGNIFICANT CHANGE UP (ref 1.7–9.3)
NEUTROPHILS # BLD AUTO: 3.79 K/UL — SIGNIFICANT CHANGE UP (ref 1.4–6.5)
NEUTROPHILS NFR BLD AUTO: 56.6 % — SIGNIFICANT CHANGE UP (ref 42.2–75.2)
NRBC # BLD: 0 /100 WBCS — SIGNIFICANT CHANGE UP (ref 0–0)
PHOSPHATE SERPL-MCNC: 3.9 MG/DL — SIGNIFICANT CHANGE UP (ref 2.1–4.9)
PLATELET # BLD AUTO: 271 K/UL — SIGNIFICANT CHANGE UP (ref 130–400)
POTASSIUM SERPL-MCNC: 4.1 MMOL/L — SIGNIFICANT CHANGE UP (ref 3.5–5)
POTASSIUM SERPL-SCNC: 4.1 MMOL/L — SIGNIFICANT CHANGE UP (ref 3.5–5)
PROT SERPL-MCNC: 7.2 G/DL — SIGNIFICANT CHANGE UP (ref 6–8)
PROTHROM AB SERPL-ACNC: 12.5 SEC — SIGNIFICANT CHANGE UP (ref 9.95–12.87)
RBC # BLD: 4.85 M/UL — SIGNIFICANT CHANGE UP (ref 4.7–6.1)
RBC # FLD: 14.8 % — HIGH (ref 11.5–14.5)
SODIUM SERPL-SCNC: 139 MMOL/L — SIGNIFICANT CHANGE UP (ref 135–146)
WBC # BLD: 6.7 K/UL — SIGNIFICANT CHANGE UP (ref 4.8–10.8)
WBC # FLD AUTO: 6.7 K/UL — SIGNIFICANT CHANGE UP (ref 4.8–10.8)

## 2021-10-31 RX ORDER — SODIUM CHLORIDE 9 MG/ML
1000 INJECTION, SOLUTION INTRAVENOUS
Refills: 0 | Status: DISCONTINUED | OUTPATIENT
Start: 2021-10-31 | End: 2021-11-01

## 2021-10-31 RX ADMIN — Medication 216 GRAM(S): at 17:30

## 2021-10-31 RX ADMIN — Medication 216 GRAM(S): at 00:52

## 2021-10-31 RX ADMIN — ENOXAPARIN SODIUM 40 MILLIGRAM(S): 100 INJECTION SUBCUTANEOUS at 11:13

## 2021-10-31 RX ADMIN — Medication 1 TABLET(S): at 11:13

## 2021-10-31 RX ADMIN — Medication 216 GRAM(S): at 10:15

## 2021-10-31 RX ADMIN — Medication 216 GRAM(S): at 05:56

## 2021-10-31 RX ADMIN — Medication 216 GRAM(S): at 15:26

## 2021-10-31 RX ADMIN — Medication 216 GRAM(S): at 21:28

## 2021-10-31 RX ADMIN — CEFTRIAXONE 100 MILLIGRAM(S): 500 INJECTION, POWDER, FOR SOLUTION INTRAMUSCULAR; INTRAVENOUS at 17:30

## 2021-10-31 RX ADMIN — Medication 216 GRAM(S): at 02:23

## 2021-10-31 NOTE — PROGRESS NOTE ADULT - ASSESSMENT
Orthopaedic Surgery Progress Note    SUBJECTIVE  No acute events overnight.   Pt was seen and examined by the orthopedic surgery team during morning rounds. Doing well. Normal Vitals. Tolerating diet. Making adequate urine.   Denies n/v, abdominal pain, diarrhea or any other major complaints.    MEDS  MEDICATIONS  (STANDING):  ampicillin  IVPB 2 Gram(s) IV Intermittent every 4 hours  cefTRIAXone   IVPB      cefTRIAXone   IVPB 2000 milliGRAM(s) IV Intermittent every 24 hours  enoxaparin Injectable 40 milliGRAM(s) SubCutaneous daily  multivitamin 1 Tablet(s) Oral daily    MEDICATIONS  (PRN):  acetaminophen     Tablet .. 650 milliGRAM(s) Oral every 6 hours PRN Temp greater or equal to 38C (100.4F), Mild Pain (1 - 3), Moderate Pain (4 - 6)  diphenhydrAMINE 25 milliGRAM(s) Oral at bedtime PRN Insomnia  magnesium hydroxide Suspension 30 milliLiter(s) Oral daily PRN Constipation  ondansetron Injectable 4 milliGRAM(s) IV Push every 6 hours PRN Nausea and/or Vomiting  oxyCODONE    IR 5 milliGRAM(s) Oral every 4 hours PRN Severe Pain (7 - 10)    --------------------------------------    T(C): 36 (10-31-21 @ 04:39), Max: 36.8 (10-30-21 @ 21:20)  HR: 75 (10-31-21 @ 04:39) (67 - 98)  BP: 108/60 (10-31-21 @ 04:39) (108/60 - 131/62)  RR: 18 (10-31-21 @ 04:39) (18 - 20)  SpO2: 98% (10-31-21 @ 04:39) (97% - 100%)    P/E:  AOx3, NAD  Nonlabored breathing      LLE  Dressing in place, c/d/i  ROM limited by pain  Compartments soft and compressible, no pain w/ passive stretch of digits  SILT sp/dp/t/sural/saph  Firing quads/hamstrings/ta/ehl/fhl/gs  DP pulse 2+ /  cap refill <2    --------------------------------------    Labs  PREOP LABS ORDERED  11AM DRAW              --------------------------------------    A/P:   27yo Male w/ LLE abscess now s/p I&D with ortho/burn on 10/25/21, POD#6  Planned for OR on 11/1/21, for JOSE and I&D  preop labs ordered, 11am draw      Weightbearing status: NWB  - OOBTC twice per day w/ assistance  Pain control: per protocol  DVT ppx: scd's + LVX  - PLEASE HOLD DVT PPX AFTER MIDNIGHT   Abx: ANCEF, CTX  Labs/Drains: trend H/H  - Repeat labs in AM  Dressing changes: to be performed by the orthopaedic surgery service  Home Meds: please reinstate as appropriate   Orthopaedic Surgery Progress Note    SUBJECTIVE  No acute events overnight.   Pt was seen and examined by the orthopedic surgery team during morning rounds. Doing well. Normal Vitals. Tolerating diet. Making adequate urine.   Denies n/v, abdominal pain, diarrhea or any other major complaints.    MEDS  MEDICATIONS  (STANDING):  ampicillin  IVPB 2 Gram(s) IV Intermittent every 4 hours  cefTRIAXone   IVPB      cefTRIAXone   IVPB 2000 milliGRAM(s) IV Intermittent every 24 hours  enoxaparin Injectable 40 milliGRAM(s) SubCutaneous daily  multivitamin 1 Tablet(s) Oral daily    MEDICATIONS  (PRN):  acetaminophen     Tablet .. 650 milliGRAM(s) Oral every 6 hours PRN Temp greater or equal to 38C (100.4F), Mild Pain (1 - 3), Moderate Pain (4 - 6)  diphenhydrAMINE 25 milliGRAM(s) Oral at bedtime PRN Insomnia  magnesium hydroxide Suspension 30 milliLiter(s) Oral daily PRN Constipation  ondansetron Injectable 4 milliGRAM(s) IV Push every 6 hours PRN Nausea and/or Vomiting  oxyCODONE    IR 5 milliGRAM(s) Oral every 4 hours PRN Severe Pain (7 - 10)    --------------------------------------    T(C): 36 (10-31-21 @ 04:39), Max: 36.8 (10-30-21 @ 21:20)  HR: 75 (10-31-21 @ 04:39) (67 - 98)  BP: 108/60 (10-31-21 @ 04:39) (108/60 - 131/62)  RR: 18 (10-31-21 @ 04:39) (18 - 20)  SpO2: 98% (10-31-21 @ 04:39) (97% - 100%)    P/E:  AOx3, NAD  Nonlabored breathing      LLE  KI/Dressing in place, moderate saturation   -removed for examination, packing removed   -new dressing applied  ·	packed w/ xeroform  ·	gauze, ABD pad, kerlix wrap, ace wrap from mid-thigh to ankle  ·	KI placed back on  ROM limited by pain  Compartments soft and compressible, no pain w/ passive stretch of digits  SILT sp/dp/t/sural/saph  Firing quads/hamstrings/ta/ehl/fhl/gs  DP pulse 2+ /  cap refill <2    --------------------------------------    Labs  PREOP LABS ORDERED  11AM DRAW              --------------------------------------    A/P:   27yo Male w/ LLE abscess now s/p I&D with ortho/burn on 10/25/21, POD#6  Planned for OR on 11/1/21, for JOSE and I&D  preop labs ordered, 11am draw      Weightbearing status: NWB  - OOBTC twice per day w/ assistance  Pain control: per protocol  DVT ppx: scd's + LVX  - PLEASE HOLD DVT PPX AFTER MIDNIGHT   Abx: ANCEF, CTX  Labs/Drains: trend H/H  - Repeat labs in AM  Dressing changes: to be performed by the orthopaedic surgery service  Home Meds: please reinstate as appropriate

## 2021-10-31 NOTE — PRE PROCEDURE NOTE - PRE PROCEDURE EVALUATION
ORTHOPEDIC SURGERY PRE OP NOTE    Diagnosis: left lower extremity abscess    Planned Procedure: left lower extremity irrigation and debridement, removal of hardware left femur    Consent: TO BE OBTAINED BY ATTENDING                   Risks/benefits/alternatives were discussed with the patient/family and they wish to proceed with surgery.       ANTICIPATED DATE OF PROCEDURE: 11/1/2021  SCHEDULED CASE OR ADD-ON CASE: ADD ON      Consent:     Clearances:     T(C): 36.6 (10-31-21 @ 16:15), Max: 36.8 (10-30-21 @ 21:20)  HR: 80 (10-31-21 @ 16:15) (75 - 98)  BP: 112/58 (10-31-21 @ 16:15) (106/58 - 131/62)  RR: 18 (10-31-21 @ 16:15) (18 - 20)  SpO2: 97% (10-31-21 @ 16:15) (97% - 100%)    Labs:                        12.8   6.70  )-----------( 271      ( 31 Oct 2021 12:39 )             40.0     10-31    139  |  104  |  13  ----------------------------<  98  4.1   |  24  |  0.7    Ca    9.4      31 Oct 2021 12:39  Phos  3.9     10-31  Mg     2.1     10-31    TPro  7.2  /  Alb  4.3  /  TBili  0.2  /  DBili  x   /  AST  15  /  ALT  26  /  AlkPhos  131<H>  10-31    PT/INR - ( 31 Oct 2021 12:39 )   PT: 12.50 sec;   INR: 1.09 ratio         PTT - ( 31 Oct 2021 12:39 )  PTT:37.5 sec  Type and Screen X 2:    COVID-19 PCR: NotDetec (25 Oct 2021 00:55)  COVID-19 PCR: NotDetec (20 Oct 2021 17:58)    [X]EKG:   [X]CXR:       DIET: NPO after midnight  IVF: per primary team      ANTICOAGULATION STATUS ( include name of anticoagulant): NONE                                         A/P: Patient is a 26y y/o M with a left lower extremity abscess, pending irrigation and debridement, removal of hardware left femur, tomorrow.     [ ] NPO and IVF @ midnight  [ ]pain control/analgesia prn    [ ]Incentive Spirometry   [ ]Notify Ortho with any questions- spectra [id number]    [ ]DISCUSSED WITH PRIMARY TEAM MEMBER (name of team member): ortho is primary team  [ ]Date and Time DISCUSSED WITH PRIMARY TEAM MEMBER: ortho is primary team

## 2021-11-01 ENCOUNTER — RESULT REVIEW (OUTPATIENT)
Age: 26
End: 2021-11-01

## 2021-11-01 LAB — SARS-COV-2 RNA SPEC QL NAA+PROBE: SIGNIFICANT CHANGE UP

## 2021-11-01 PROCEDURE — 88300 SURGICAL PATH GROSS: CPT | Mod: 26

## 2021-11-01 RX ORDER — CEFTRIAXONE 500 MG/1
1000 INJECTION, POWDER, FOR SOLUTION INTRAMUSCULAR; INTRAVENOUS EVERY 24 HOURS
Refills: 0 | Status: DISCONTINUED | OUTPATIENT
Start: 2021-11-01 | End: 2021-11-03

## 2021-11-01 RX ORDER — ONDANSETRON 8 MG/1
4 TABLET, FILM COATED ORAL EVERY 6 HOURS
Refills: 0 | Status: DISCONTINUED | OUTPATIENT
Start: 2021-11-01 | End: 2021-11-08

## 2021-11-01 RX ORDER — HYDROMORPHONE HYDROCHLORIDE 2 MG/ML
0.5 INJECTION INTRAMUSCULAR; INTRAVENOUS; SUBCUTANEOUS
Refills: 0 | Status: DISCONTINUED | OUTPATIENT
Start: 2021-11-01 | End: 2021-11-01

## 2021-11-01 RX ORDER — OXYCODONE HYDROCHLORIDE 5 MG/1
5 TABLET ORAL EVERY 4 HOURS
Refills: 0 | Status: DISCONTINUED | OUTPATIENT
Start: 2021-11-01 | End: 2021-11-01

## 2021-11-01 RX ORDER — SODIUM CHLORIDE 9 MG/ML
1000 INJECTION, SOLUTION INTRAVENOUS
Refills: 0 | Status: DISCONTINUED | OUTPATIENT
Start: 2021-11-01 | End: 2021-11-03

## 2021-11-01 RX ORDER — HYDROMORPHONE HYDROCHLORIDE 2 MG/ML
1 INJECTION INTRAMUSCULAR; INTRAVENOUS; SUBCUTANEOUS
Refills: 0 | Status: DISCONTINUED | OUTPATIENT
Start: 2021-11-01 | End: 2021-11-02

## 2021-11-01 RX ORDER — HYDROMORPHONE HYDROCHLORIDE 2 MG/ML
1 INJECTION INTRAMUSCULAR; INTRAVENOUS; SUBCUTANEOUS
Refills: 0 | Status: DISCONTINUED | OUTPATIENT
Start: 2021-11-01 | End: 2021-11-01

## 2021-11-01 RX ORDER — MAGNESIUM HYDROXIDE 400 MG/1
30 TABLET, CHEWABLE ORAL DAILY
Refills: 0 | Status: DISCONTINUED | OUTPATIENT
Start: 2021-11-01 | End: 2021-11-01

## 2021-11-01 RX ORDER — DIPHENHYDRAMINE HCL 50 MG
25 CAPSULE ORAL AT BEDTIME
Refills: 0 | Status: DISCONTINUED | OUTPATIENT
Start: 2021-11-01 | End: 2021-11-01

## 2021-11-01 RX ORDER — ENOXAPARIN SODIUM 100 MG/ML
40 INJECTION SUBCUTANEOUS DAILY
Refills: 0 | Status: DISCONTINUED | OUTPATIENT
Start: 2021-11-01 | End: 2021-11-08

## 2021-11-01 RX ORDER — DIPHENHYDRAMINE HCL 50 MG
25 CAPSULE ORAL AT BEDTIME
Refills: 0 | Status: DISCONTINUED | OUTPATIENT
Start: 2021-11-01 | End: 2021-11-08

## 2021-11-01 RX ORDER — ONDANSETRON 8 MG/1
4 TABLET, FILM COATED ORAL EVERY 6 HOURS
Refills: 0 | Status: DISCONTINUED | OUTPATIENT
Start: 2021-11-01 | End: 2021-11-01

## 2021-11-01 RX ORDER — ONDANSETRON 8 MG/1
4 TABLET, FILM COATED ORAL ONCE
Refills: 0 | Status: DISCONTINUED | OUTPATIENT
Start: 2021-11-01 | End: 2021-11-01

## 2021-11-01 RX ORDER — AMPICILLIN TRIHYDRATE 250 MG
2 CAPSULE ORAL EVERY 4 HOURS
Refills: 0 | Status: DISCONTINUED | OUTPATIENT
Start: 2021-11-01 | End: 2021-11-08

## 2021-11-01 RX ORDER — OXYCODONE HYDROCHLORIDE 5 MG/1
5 TABLET ORAL EVERY 4 HOURS
Refills: 0 | Status: DISCONTINUED | OUTPATIENT
Start: 2021-11-01 | End: 2021-11-07

## 2021-11-01 RX ORDER — MAGNESIUM HYDROXIDE 400 MG/1
30 TABLET, CHEWABLE ORAL DAILY
Refills: 0 | Status: DISCONTINUED | OUTPATIENT
Start: 2021-11-01 | End: 2021-11-08

## 2021-11-01 RX ORDER — ONDANSETRON 8 MG/1
4 TABLET, FILM COATED ORAL ONCE
Refills: 0 | Status: DISCONTINUED | OUTPATIENT
Start: 2021-11-01 | End: 2021-11-08

## 2021-11-01 RX ORDER — ACETAMINOPHEN 500 MG
650 TABLET ORAL EVERY 6 HOURS
Refills: 0 | Status: DISCONTINUED | OUTPATIENT
Start: 2021-11-01 | End: 2021-11-01

## 2021-11-01 RX ORDER — HYDROMORPHONE HYDROCHLORIDE 2 MG/ML
0.5 INJECTION INTRAMUSCULAR; INTRAVENOUS; SUBCUTANEOUS
Refills: 0 | Status: DISCONTINUED | OUTPATIENT
Start: 2021-11-01 | End: 2021-11-02

## 2021-11-01 RX ORDER — ACETAMINOPHEN 500 MG
650 TABLET ORAL EVERY 6 HOURS
Refills: 0 | Status: DISCONTINUED | OUTPATIENT
Start: 2021-11-01 | End: 2021-11-08

## 2021-11-01 RX ADMIN — HYDROMORPHONE HYDROCHLORIDE 1 MILLIGRAM(S): 2 INJECTION INTRAMUSCULAR; INTRAVENOUS; SUBCUTANEOUS at 22:45

## 2021-11-01 RX ADMIN — HYDROMORPHONE HYDROCHLORIDE 1 MILLIGRAM(S): 2 INJECTION INTRAMUSCULAR; INTRAVENOUS; SUBCUTANEOUS at 22:29

## 2021-11-01 RX ADMIN — SODIUM CHLORIDE 75 MILLILITER(S): 9 INJECTION, SOLUTION INTRAVENOUS at 00:00

## 2021-11-01 RX ADMIN — Medication 216 GRAM(S): at 14:28

## 2021-11-01 RX ADMIN — SODIUM CHLORIDE 100 MILLILITER(S): 9 INJECTION, SOLUTION INTRAVENOUS at 22:50

## 2021-11-01 RX ADMIN — Medication 216 GRAM(S): at 02:21

## 2021-11-01 RX ADMIN — HYDROMORPHONE HYDROCHLORIDE 1 MILLIGRAM(S): 2 INJECTION INTRAMUSCULAR; INTRAVENOUS; SUBCUTANEOUS at 23:00

## 2021-11-01 RX ADMIN — Medication 216 GRAM(S): at 05:39

## 2021-11-01 RX ADMIN — Medication 216 GRAM(S): at 11:18

## 2021-11-01 NOTE — PROGRESS NOTE ADULT - ASSESSMENT
ORTHOPEDIC SURGERY POC    26y Male s/p L femur removal of hardware, I&D. No active complains  Denies numbness/tingling.  Denies f/c/n/v/cp/sob.    Medications:  acetaminophen     Tablet .. 650 milliGRAM(s) Oral every 6 hours PRN  ampicillin  IVPB 2 Gram(s) IV Intermittent every 4 hours  cefTRIAXone   IVPB 1000 milliGRAM(s) IV Intermittent every 24 hours  diphenhydrAMINE 25 milliGRAM(s) Oral at bedtime PRN  enoxaparin Injectable 40 milliGRAM(s) SubCutaneous daily  HYDROmorphone  Injectable 0.5 milliGRAM(s) IV Push every 10 minutes PRN  HYDROmorphone  Injectable 1 milliGRAM(s) IV Push every 10 minutes PRN  HYDROmorphone  Injectable 1 milliGRAM(s) IV Push every 10 minutes PRN  HYDROmorphone  Injectable 0.5 milliGRAM(s) IV Push every 10 minutes PRN  lactated ringers. 1000 milliLiter(s) IV Continuous <Continuous>  magnesium hydroxide Suspension 30 milliLiter(s) Oral daily PRN  multivitamin 1 Tablet(s) Oral daily  ondansetron Injectable 4 milliGRAM(s) IV Push once PRN  ondansetron Injectable 4 milliGRAM(s) IV Push every 6 hours PRN  oxyCODONE    IR 5 milliGRAM(s) Oral every 4 hours PRN    No Known Allergies      PMH/PSH:      Exam:  T(C): 37.3 (11-01-21 @ 22:20), Max: 37.3 (11-01-21 @ 22:20)  HR: 95 (11-01-21 @ 23:15) (67 - 100)  BP: 123/66 (11-01-21 @ 23:15) (104/56 - 144/66)  RR: 21 (11-01-21 @ 23:15) (15 - 21)  SpO2: 99% (11-01-21 @ 23:15) (97% - 100%)  General: Awake, alert, NAD, AAOx3    LLE: Dressing c/d/i SILT s/s/sp/dp/t.  Motor intact EHL, TA, Gastroc.  No ecchymosis or gross deformity.  Palpable DP, PT pulses    Labs:                        12.8   6.70  )-----------( 271      ( 31 Oct 2021 12:39 )             40.0     10-31    139  |  104  |  13  ----------------------------<  98  4.1   |  24  |  0.7    Ca    9.4      31 Oct 2021 12:39  Phos  3.9     10-31  Mg     2.1     10-31    TPro  7.2  /  Alb  4.3  /  TBili  0.2  /  DBili  x   /  AST  15  /  ALT  26  /  AlkPhos  131<H>  10-31    PT/INR - ( 31 Oct 2021 12:39 )   PT: 12.50 sec;   INR: 1.09 ratio         PTT - ( 31 Oct 2021 12:39 )  PTT:37.5 sec      A/P:  26yMale with s/p L femur removal of hardware, I &D POD 0     - WBAT LLE  - ABX per ID  - DVT PPX  - Burn consult for abscess wound   - F/u cultures

## 2021-11-01 NOTE — BRIEF OPERATIVE NOTE - NSICDXBRIEFPROCEDURE_GEN_ALL_CORE_FT
PROCEDURES:  Incision and drainage of deep abscess of lower leg 25-Oct-2021 21:10:18  Lesley Barajas  
PROCEDURES:  Debridement of left femur 01-Nov-2021 21:51:51 for osteomyelitis; CPT code 30688 Louis Norris  Removal of deep implant 01-Nov-2021 21:53:11 IMN nail and screws, CPT code 20680x3 (proximal interlock, disal interlock and IM nail) Louis Norris

## 2021-11-01 NOTE — PRE-ANESTHESIA EVALUATION ADULT - NSANTHOSAYNRD_GEN_A_CORE
No. SARAH screening performed.  STOP BANG Legend: 0-2 = LOW Risk; 3-4 = INTERMEDIATE Risk; 5-8 = HIGH Risk
No. SARAH screening performed.  STOP BANG Legend: 0-2 = LOW Risk; 3-4 = INTERMEDIATE Risk; 5-8 = HIGH Risk

## 2021-11-01 NOTE — CHART NOTE - NSCHARTNOTEFT_GEN_A_CORE
PACU ANESTHESIA ADMISSION NOTE      Procedure: Incision and drainage of deep abscess of lower leg    Debridement of left femur  for osteomyelitis; CPT code 73306    Removal of deep implant  IMN nail and screws, CPT code 20680x3 (proximal interlock, disal interlock and IM nail)      Post op diagnosis:  Abscess of leg, left    Acute osteomyelitis        ____  Intubated  TV:______       Rate: ______      FiO2: ______    __x__  Patent Airway    ____  Full return of protective reflexes    ____  Full recovery from anesthesia / back to baseline     Vitals:   T:       98    R:   12               BP:     111/86              Sat:    98%               P:  101      Mental Status:  __x__ Awake   _____ Alert   _____ Drowsy   _____ Sedated    Nausea/Vomiting:  __x__ NO  ______Yes,   See Post - Op Orders          Pain Scale (0-10):  _____    Treatment: ____ None    ___x_ See Post - Op/PCA Orders    Post - Operative Fluids:   ____ Oral   ___x_ See Post - Op Orders    Plan: Discharge:   ____Home       ___x__Floor     _____Critical Care    _____  Other:_________________    Comments:  Uneventful intraoperative course. No anesthesia issues or complications noted.  Patient stable upon arrival to PACU. Report given to RN. Discharge when criteria met.

## 2021-11-01 NOTE — BRIEF OPERATIVE NOTE - NSICDXBRIEFPOSTOP_GEN_ALL_CORE_FT
POST-OP DIAGNOSIS:  Abscess of leg, left 25-Oct-2021 21:11:14  Lesley Barajas  
POST-OP DIAGNOSIS:  Acute osteomyelitis 01-Nov-2021 21:54:38 left femur Louis Norris

## 2021-11-01 NOTE — BRIEF OPERATIVE NOTE - NSICDXBRIEFPREOP_GEN_ALL_CORE_FT
PRE-OP DIAGNOSIS:  Abscess of left leg 25-Oct-2021 21:10:46  Lesley Barajas  
PRE-OP DIAGNOSIS:  Acute osteomyelitis 01-Nov-2021 21:54:19 left femur Louis Norris

## 2021-11-01 NOTE — BRIEF OPERATIVE NOTE - OPERATION/FINDINGS
little evidence of gross infection; aleksandra over growth of prior hardware; post op - WBAT; Abx per ID  Dict:  Explanted: Synthes piriformis nail and 3 x 5.0 interlocks little evidence of gross infection; aleksandra over growth of prior hardware; post op - WBAT; Abx per ID  Dict:[id number]  Explanted: Synthes piriformis nail and 3 x 5.0 interlocks

## 2021-11-02 LAB
GLUCOSE BLDC GLUCOMTR-MCNC: 143 MG/DL — HIGH (ref 70–99)
HCT VFR BLD CALC: 31.2 % — LOW (ref 42–52)
HGB BLD-MCNC: 10.2 G/DL — LOW (ref 14–18)
MCHC RBC-ENTMCNC: 26.6 PG — LOW (ref 27–31)
MCHC RBC-ENTMCNC: 32.7 G/DL — SIGNIFICANT CHANGE UP (ref 32–37)
MCV RBC AUTO: 81.5 FL — SIGNIFICANT CHANGE UP (ref 80–94)
NRBC # BLD: 0 /100 WBCS — SIGNIFICANT CHANGE UP (ref 0–0)
PLATELET # BLD AUTO: 259 K/UL — SIGNIFICANT CHANGE UP (ref 130–400)
RBC # BLD: 3.83 M/UL — LOW (ref 4.7–6.1)
RBC # FLD: 14.7 % — HIGH (ref 11.5–14.5)
WBC # BLD: 9.11 K/UL — SIGNIFICANT CHANGE UP (ref 4.8–10.8)
WBC # FLD AUTO: 9.11 K/UL — SIGNIFICANT CHANGE UP (ref 4.8–10.8)

## 2021-11-02 RX ADMIN — OXYCODONE HYDROCHLORIDE 5 MILLIGRAM(S): 5 TABLET ORAL at 23:18

## 2021-11-02 RX ADMIN — OXYCODONE HYDROCHLORIDE 5 MILLIGRAM(S): 5 TABLET ORAL at 10:44

## 2021-11-02 RX ADMIN — Medication 216 GRAM(S): at 10:33

## 2021-11-02 RX ADMIN — Medication 216 GRAM(S): at 05:33

## 2021-11-02 RX ADMIN — CEFTRIAXONE 100 MILLIGRAM(S): 500 INJECTION, POWDER, FOR SOLUTION INTRAMUSCULAR; INTRAVENOUS at 03:45

## 2021-11-02 RX ADMIN — Medication 216 GRAM(S): at 13:03

## 2021-11-02 RX ADMIN — Medication 216 GRAM(S): at 18:01

## 2021-11-02 RX ADMIN — Medication 216 GRAM(S): at 21:24

## 2021-11-02 RX ADMIN — OXYCODONE HYDROCHLORIDE 5 MILLIGRAM(S): 5 TABLET ORAL at 11:14

## 2021-11-02 RX ADMIN — Medication 1 TABLET(S): at 11:55

## 2021-11-02 RX ADMIN — Medication 216 GRAM(S): at 01:26

## 2021-11-02 RX ADMIN — ENOXAPARIN SODIUM 40 MILLIGRAM(S): 100 INJECTION SUBCUTANEOUS at 11:54

## 2021-11-03 LAB — SURGICAL PATHOLOGY STUDY: SIGNIFICANT CHANGE UP

## 2021-11-03 PROCEDURE — 99231 SBSQ HOSP IP/OBS SF/LOW 25: CPT

## 2021-11-03 RX ORDER — OXYCODONE HYDROCHLORIDE 5 MG/1
5 TABLET ORAL DAILY
Refills: 0 | Status: DISCONTINUED | OUTPATIENT
Start: 2021-11-03 | End: 2021-11-08

## 2021-11-03 RX ADMIN — Medication 216 GRAM(S): at 21:35

## 2021-11-03 RX ADMIN — Medication 216 GRAM(S): at 05:35

## 2021-11-03 RX ADMIN — CEFTRIAXONE 100 MILLIGRAM(S): 500 INJECTION, POWDER, FOR SOLUTION INTRAMUSCULAR; INTRAVENOUS at 03:12

## 2021-11-03 RX ADMIN — OXYCODONE HYDROCHLORIDE 5 MILLIGRAM(S): 5 TABLET ORAL at 23:01

## 2021-11-03 RX ADMIN — Medication 216 GRAM(S): at 12:28

## 2021-11-03 RX ADMIN — Medication 216 GRAM(S): at 18:50

## 2021-11-03 RX ADMIN — Medication 216 GRAM(S): at 15:32

## 2021-11-03 RX ADMIN — OXYCODONE HYDROCHLORIDE 5 MILLIGRAM(S): 5 TABLET ORAL at 00:18

## 2021-11-03 RX ADMIN — ENOXAPARIN SODIUM 40 MILLIGRAM(S): 100 INJECTION SUBCUTANEOUS at 12:32

## 2021-11-03 RX ADMIN — OXYCODONE HYDROCHLORIDE 5 MILLIGRAM(S): 5 TABLET ORAL at 21:50

## 2021-11-03 RX ADMIN — Medication 216 GRAM(S): at 02:28

## 2021-11-03 RX ADMIN — SODIUM CHLORIDE 100 MILLILITER(S): 9 INJECTION, SOLUTION INTRAVENOUS at 16:35

## 2021-11-03 RX ADMIN — Medication 1 TABLET(S): at 12:31

## 2021-11-03 NOTE — PROGRESS NOTE ADULT - SUBJECTIVE AND OBJECTIVE BOX
25yo Male with PMhx of RLE fx,  s/p IMN mp fixation of femoral shaft fracture w. dr. Norris in 2016, presented with Left knee abscess, s/p LLE abscess  I&D with ortho on 10/25, now s/p L femur removal of hardware, I&D POD 2 w/ ortho. Ortho requesting burn to follow for recommendations for wound that was left open.     AM rounds       Pt: no complaints.   No acute events o/n    Vital Signs Last 24 Hrs  T(C): 36.4 (03 Nov 2021 09:00), Max: 37.3 (02 Nov 2021 16:58)  T(F): 97.5 (03 Nov 2021 09:00), Max: 99.1 (02 Nov 2021 16:58)  HR: 104 (03 Nov 2021 09:00) (92 - 112)  BP: 125/66 (03 Nov 2021 09:00) (113/67 - 154/65)  RR: 18 (03 Nov 2021 09:00) (18 - 18)  SpO2: 99% (03 Nov 2021 09:00) (97% - 99%)    Culture - Other (10.25.21 @ 20:29)    -  Vancomycin: S 2    -  Ampicillin: S <=2 Predicts results to ampicillin/sulbactam, amoxacillin-clavulanate and  piperacillin-tazobactam.    -  Tetra/Doxy: S <=4    Specimen Source: .Other left knee    Culture Results:   Moderate Enterococcus faecalis    Organism Identification: Enterococcus faecalis    Organism: Enterococcus faecalis    Method Type: GARFIELD                                10.2   9.11  )-----------( 259      ( 02 Nov 2021 13:16 )             31.2       EXAM:   General: Well developed, well nourished appearing male, NAD  Neuro: AAO x 3   Resp: on RA, breathing comfortably  Wound:   LLE there is an incision site, staples clean and intact. Lateral to the left knee there is a full thickness wound ~4x4cm opening that undermines in all directions and is deep, exposed tissue is pink, moist, healthy appearing, no active purulent drainage, no erythema or bleeding.

## 2021-11-03 NOTE — PROGRESS NOTE ADULT - SUBJECTIVE AND OBJECTIVE BOX
URBINAESPINAL, ABIMAEL  26y, Male  Allergy: No Known Allergies      LOS  9d    CHIEF COMPLAINT: Left knee abscess (29 Oct 2021 22:05)      INTERVAL EVENTS/HPI  - No acute events overnight  - T(F): , Max: 99.1 (11-02-21 @ 16:58)  - Denies any worsening symptoms  - Tolerating medication  - WBC Count: 9.11 (11-02-21 @ 13:16)  WBC Count: 6.70 (10-31-21 @ 12:39)     -      ROS  General: Denies rigors, nightsweats  HEENT: Denies headache, rhinorrhea, sore throat, eye pain  CV: Denies CP, palpitations  PULM: Denies wheezing, hemoptysis  GI: Denies hematemesis, hematochezia, melena  : Denies discharge, hematuria  MSK: Denies arthralgias, myalgias  SKIN: Denies rash, lesions  NEURO: Denies paresthesias, weakness  PSYCH: Denies depression, anxiety    VITALS:  T(F): 98.4, Max: 99.1 (11-02-21 @ 16:58)  HR: 99  BP: 114/70  RR: 18Vital Signs Last 24 Hrs  T(C): 36.9 (03 Nov 2021 05:16), Max: 37.3 (02 Nov 2021 16:58)  T(F): 98.4 (03 Nov 2021 05:16), Max: 99.1 (02 Nov 2021 16:58)  HR: 99 (03 Nov 2021 05:16) (92 - 112)  BP: 114/70 (03 Nov 2021 05:16) (113/67 - 154/65)  BP(mean): --  RR: 18 (03 Nov 2021 05:16) (18 - 18)  SpO2: 99% (03 Nov 2021 05:16) (97% - 99%)    PHYSICAL EXAM:  Gen: NAD, resting in bed  HEENT: Normocephalic, atraumatic  Neck: supple, no lymphadenopathy  CV: Regular rate & regular rhythm  Lungs: decreased BS at bases, no fremitus  Abdomen: Soft, BS present  Ext: Warm, well perfused  Neuro: non focal, awake  Skin: no rash, no erythema  Lines: no phlebitis    FH: Non-contributory  Social Hx: Non-contributory    TESTS & MEASUREMENTS:                        10.2   9.11  )-----------( 259      ( 02 Nov 2021 13:16 )             31.2                   Culture - Body Fluid with Gram Stain (collected 11-01-21 @ 20:45)  Source: .Body Fluid LEFT FEMORALCANAL  Gram Stain (11-02-21 @ 15:21):    No polymorphonuclear leukocytes seen per low power field    Rare Gram positive cocci in pairs seen per oil power field    Culture - Fungal, Other (collected 11-01-21 @ 20:45)  Source: .Other  LEFT FEMORALCANAL  Preliminary Report (11-03-21 @ 06:53):    Testing in progress    Culture - Other (collected 10-25-21 @ 20:29)  Source: .Other left knee  Final Report (10-28-21 @ 12:50):    Moderate Enterococcus faecalis  Organism: Enterococcus faecalis (10-28-21 @ 12:50)  Organism: Enterococcus faecalis (10-28-21 @ 12:50)      -  Ampicillin: S <=2 Predicts results to ampicillin/sulbactam, amoxacillin-clavulanate and  piperacillin-tazobactam.      -  Tetra/Doxy: S <=4      -  Vancomycin: S 2      Method Type: GARFIELD    Culture - Acid Fast - Other w/Smear (collected 10-25-21 @ 19:59)  Source: .Other LEFT LEG  Preliminary Report (10-27-21 @ 15:04):    Culture is being performed.    Culture - Fungal, Other (collected 10-25-21 @ 19:59)  Source: .Other LEFT LEG  Preliminary Report (10-27-21 @ 07:29):    Testing in progress    Culture - Abscess with Gram Stain (collected 10-25-21 @ 19:59)  Source: .Abscess LEFT LEG  Final Report (10-31-21 @ 08:11):    Normal skin jas isolated    Culture - Fungal, Other (collected 10-25-21 @ 19:58)  Source: .Other LEFT LEG  Preliminary Report (10-27-21 @ 07:57):    Testing in progress    Culture - Acid Fast - Other w/Smear (collected 10-25-21 @ 19:58)  Source: .Other LEFT LEG  Preliminary Report (10-27-21 @ 15:04):    Culture is being performed.    Culture - Abscess with Gram Stain (collected 10-25-21 @ 19:58)  Source: .Abscess LEFT LEG  Final Report (10-31-21 @ 08:22):    Few Enterococcus faecalis  Organism: Enterococcus faecalis (10-31-21 @ 08:22)  Organism: Enterococcus faecalis (10-31-21 @ 08:22)      -  Ampicillin: S <=2 Predicts results to ampicillin/sulbactam, amoxacillin-clavulanate and  piperacillin-tazobactam.      -  Tetra/Doxy: S <=1      -  Vancomycin: S 2      Method Type: GARFIELD    Culture - Blood (collected 10-20-21 @ 20:00)  Source: .Blood Blood-Peripheral  Final Report (10-26-21 @ 01:00):    No Growth Final    Culture - Blood (collected 10-20-21 @ 19:58)  Source: .Blood Blood-Peripheral  Final Report (10-26-21 @ 01:00):    No Growth Final            INFECTIOUS DISEASES TESTING  COVID-19 PCR: NotDetec (10-31-21 @ 16:40)  COVID-19 PCR: NotDetec (10-25-21 @ 00:55)  COVID-19 PCR: NotDetec (10-20-21 @ 17:58)      INFLAMMATORY MARKERS  Sedimentation Rate, Erythrocyte: 39 mm/Hr (10-27-21 @ 06:00)  C-Reactive Protein, Serum: 9 mg/L (10-27-21 @ 06:00)  Sedimentation Rate, Erythrocyte: 18 mm/Hr (10-24-21 @ 22:10)  C-Reactive Protein, Serum: 25 mg/L (10-24-21 @ 22:10)      RADIOLOGY & ADDITIONAL TESTS:  I have personally reviewed the last available Chest xray  CXR      CT      CARDIOLOGY TESTING  12 Lead ECG:   Ventricular Rate 87 BPM    Atrial Rate 87 BPM    P-R Interval 140 ms    QRS Duration 90 ms    Q-T Interval 338 ms    QTC Calculation(Bazett) 406 ms    P Axis 51 degrees    R Axis 77 degrees    T Axis 5 degrees    Diagnosis Line Normal sinus rhythm  Nonspecific T wave abnormality  Abnormal ECG    Confirmed by ISABELLE CROCKETT MD ([id number]) on 10/24/2021 10:53:00 PM (10-24-21 @ 22:25)  12 Lead ECG:   Ventricular Rate 74 BPM    Atrial Rate 74 BPM    P-R Interval 156 ms    QRS Duration 98 ms    Q-T Interval 360 ms    QTC Calculation(Bazett) 399 ms    P Axis 41 degrees    R Axis 51 degrees    T Axis 10 degrees    Diagnosis Line Normal sinus rhythm  Normal ECG    Confirmed by BRETT QUIROZ MD ([id number]) on 10/21/2021 7:46:15 AM (10-21-21 @ 02:07)      MEDICATIONS  ampicillin  IVPB 2 IV Intermittent every 4 hours  cefTRIAXone   IVPB 1000 IV Intermittent every 24 hours  enoxaparin Injectable 40 SubCutaneous daily  lactated ringers. 1000 IV Continuous <Continuous>  multivitamin 1 Oral daily      WEIGHT  Weight (kg): 107.5 (11-01-21 @ 18:31)      ANTIBIOTICS:  ampicillin  IVPB 2 Gram(s) IV Intermittent every 4 hours  cefTRIAXone   IVPB 1000 milliGRAM(s) IV Intermittent every 24 hours      All available historical records have been reviewed

## 2021-11-03 NOTE — PROGRESS NOTE ADULT - ASSESSMENT
ASSESSMENT  26yMale presents with pain in Left leg. Has soft tissue infection with assocated abscess    IMPRESSION  #Left Lower Extremity soft tissue infection/possible OM in setting of intramedullary mp fixation   - s/p IMN mp fixation of femoral shaft fracture   - CT Lower Extremity w/ IV Cont, Left (10.20.21 @ 22:50): Lucency adjacent to the distal mp and screws is demonstrated at multiple levels. Cortical disruption is noted at multiple locations, most prominently in the distal posterior femoral shaft (series 7 image 391). Large, ill-defined abscess extending from the medullary canal of the left femur into the surrounding soft tissues including the lateral thigh musculature and subcutaneous tissues (series 7 image 381). The dominant subcutaneous collectionis noted lateral to the distal femoral shaft just above the knee measuring up to 9 x 2 enhanced centimeters.  - s/p I and D of left leg absces (about 6 x 6 cm in size)  -- Wound Cx with Enterococcus species   - s/p debridement of left femur and removal of deep implant (IMN nail and scres  11/1 -- little evidence of fross infection    #Obesity BMI (kg/m2): 35, 35  #Abx allergy: NKDA      RECOMMENDATIONS  - continue ampicillin 2g q 4 hours  - as hardware is removed - can stop ceftriaxone   - will need at least 6 weeks from time of hardware removal -- tentatively to finish with ampicillin 12 g q 24 hours continuous infusion     Please call or message on [employer] Teams if with any questions.  Spectra [phone]

## 2021-11-03 NOTE — PROGRESS NOTE ADULT - ASSESSMENT
27 yo male  s/p L femur removal of hardware, I &D POD 2 w/ ortho. Burn following for LLE abscess, left open by orthopedics.    Plan  - Wound culture taken today - if culture results are negative then may close wound in OR  - C/w LWC for now - wash with soap and water. Pack with kerlix WTD, moist with saline, cover with ABD, wrap with kerlix/ACE.   - IV abx as indicated/per ID rec's  - Pain control for dressing changes  - Remainder of care per primary team   - Will follow

## 2021-11-03 NOTE — PROGRESS NOTE ADULT - ASSESSMENT
Orthopaedics Progress Note    ABIMAEL SEGURAAESPINAL  [id number]    26y Male s/p L femur removal of hardware, I&D POD 2. Dressings changes at bedside  Denies numbness/tingling  Pain well controlled  No other complaints    acetaminophen     Tablet .. 650 milliGRAM(s) Oral every 6 hours PRN  ampicillin  IVPB 2 Gram(s) IV Intermittent every 4 hours  cefTRIAXone   IVPB 1000 milliGRAM(s) IV Intermittent every 24 hours  diphenhydrAMINE 25 milliGRAM(s) Oral at bedtime PRN  enoxaparin Injectable 40 milliGRAM(s) SubCutaneous daily  HYDROmorphone  Injectable 1 milliGRAM(s) IV Push every 10 minutes PRN  HYDROmorphone  Injectable 0.5 milliGRAM(s) IV Push every 10 minutes PRN  lactated ringers. 1000 milliLiter(s) IV Continuous <Continuous>  magnesium hydroxide Suspension 30 milliLiter(s) Oral daily PRN  multivitamin 1 Tablet(s) Oral daily  ondansetron Injectable 4 milliGRAM(s) IV Push every 6 hours PRN  ondansetron Injectable 4 milliGRAM(s) IV Push once PRN  oxyCODONE    IR 5 milliGRAM(s) Oral every 4 hours PRN      T(C): 36.9 (11-03-21 @ 05:16), Max: 37.3 (11-02-21 @ 16:58)  HR: 99 (11-03-21 @ 05:16) (92 - 112)  BP: 114/70 (11-03-21 @ 05:16) (113/67 - 154/65)  RR: 18 (11-03-21 @ 05:16) (18 - 18)  SpO2: 99% (11-03-21 @ 05:16) (96% - 99%)    Physical Exam  NAD  Breathing comfortably on RA  Resting comfortably        LLE  Dressing over lateral abscess saturated- superficial gauze dressing changed  Incisional dressing, c/d/i and changed  Motor: TA/EHL/Gastroc intact  Sensory: SP/DP/Nieves/Sa intact  Vasc: foot WWP, 2+ DP pulse    Labs                        10.2   9.11  )-----------( 259      ( 02 Nov 2021 13:16 )             31.2         A/P:  26yMale with s/p L femur removal of hardware, I &D POD 2    - WBAT LLE  - ABX per ID  - DVT PPX  - Burn consult for lateral knee draining abscess   - F/u cultures- cocci in pairs

## 2021-11-04 RX ADMIN — OXYCODONE HYDROCHLORIDE 5 MILLIGRAM(S): 5 TABLET ORAL at 21:00

## 2021-11-04 RX ADMIN — OXYCODONE HYDROCHLORIDE 5 MILLIGRAM(S): 5 TABLET ORAL at 20:28

## 2021-11-04 RX ADMIN — Medication 216 GRAM(S): at 15:45

## 2021-11-04 RX ADMIN — Medication 216 GRAM(S): at 02:13

## 2021-11-04 RX ADMIN — Medication 216 GRAM(S): at 21:14

## 2021-11-04 RX ADMIN — Medication 216 GRAM(S): at 10:58

## 2021-11-04 RX ADMIN — Medication 1 TABLET(S): at 14:40

## 2021-11-04 RX ADMIN — Medication 216 GRAM(S): at 18:38

## 2021-11-04 RX ADMIN — Medication 216 GRAM(S): at 05:19

## 2021-11-04 RX ADMIN — ENOXAPARIN SODIUM 40 MILLIGRAM(S): 100 INJECTION SUBCUTANEOUS at 14:40

## 2021-11-04 NOTE — PROGRESS NOTE ADULT - SUBJECTIVE AND OBJECTIVE BOX
POD#3 s/p i&d and sasha femur   T(C): 35.9 (11-04-21 @ 05:00), Max: 37.2 (11-03-21 @ 17:30)  HR: 84 (11-04-21 @ 05:00) (84 - 94)  BP: 113/62 (11-04-21 @ 05:00) (111/57 - 128/60)  RR: 18 (11-04-21 @ 05:00) (18 - 18)  SpO2: 98% (11-04-21 @ 05:00) (98% - 100%)                        10.2   9.11  )-----------( 259      ( 02 Nov 2021 13:16 )             31.2         PTS PAIN IS CONTROLLED   WOUND following burn recs   N/V/I  ABX cont as per id   DVT PROPHYLAXIS IN PLACE  wbat     plastics Plan  - Wound culture taken today - if culture results are negative then may close wound in OR  - C/w LWC for now - wash with soap and water. Pack with kerlix WTD, moist with saline, cover with ABD, wrap with kerlix/ACE.   - IV abx as indicated/per ID rec's      ID RECOMMENDATIONS  - continue ampicillin 2g q 4 hours  - as hardware is removed - can stop ceftriaxone   - will need at least 6 weeks from time of hardware removal -- tentatively to finish with ampicillin 12 g q 24 hours continuous infusion

## 2021-11-05 RX ORDER — DAPTOMYCIN 500 MG/10ML
1000 INJECTION, POWDER, LYOPHILIZED, FOR SOLUTION INTRAVENOUS EVERY 24 HOURS
Refills: 0 | Status: DISCONTINUED | OUTPATIENT
Start: 2021-11-05 | End: 2021-11-08

## 2021-11-05 RX ADMIN — ENOXAPARIN SODIUM 40 MILLIGRAM(S): 100 INJECTION SUBCUTANEOUS at 11:24

## 2021-11-05 RX ADMIN — Medication 216 GRAM(S): at 05:24

## 2021-11-05 RX ADMIN — Medication 216 GRAM(S): at 14:39

## 2021-11-05 RX ADMIN — OXYCODONE HYDROCHLORIDE 5 MILLIGRAM(S): 5 TABLET ORAL at 06:09

## 2021-11-05 RX ADMIN — OXYCODONE HYDROCHLORIDE 5 MILLIGRAM(S): 5 TABLET ORAL at 06:39

## 2021-11-05 RX ADMIN — DAPTOMYCIN 140 MILLIGRAM(S): 500 INJECTION, POWDER, LYOPHILIZED, FOR SOLUTION INTRAVENOUS at 23:24

## 2021-11-05 RX ADMIN — Medication 216 GRAM(S): at 09:58

## 2021-11-05 RX ADMIN — Medication 216 GRAM(S): at 17:23

## 2021-11-05 RX ADMIN — Medication 216 GRAM(S): at 02:14

## 2021-11-05 RX ADMIN — Medication 1 TABLET(S): at 11:24

## 2021-11-05 RX ADMIN — Medication 216 GRAM(S): at 21:41

## 2021-11-05 NOTE — PROGRESS NOTE ADULT - SUBJECTIVE AND OBJECTIVE BOX
POD#4 S/P I&D AND JOSE     T(C): 36.5 (11-05-21 @ 04:58), Max: 37.7 (11-04-21 @ 20:10)  HR: 95 (11-05-21 @ 08:01) (92 - 106)  BP: 106/59 (11-05-21 @ 08:01) (106/59 - 124/64)  RR: 16 (11-05-21 @ 08:01) (16 - 18)  SpO2: 98% (11-05-21 @ 08:01) (98% - 100%)            PTS PAIN IS CONTROLLED   WOUND LEFT POSTERO LATERAL WOUND IS GRANULATING WELL PACKED WITH XEROFORM 2.5 CM DEEP KERLEX ABD ACE   NEW CX FROM 11/3 BACILLUS IS ALSO GROWING , ID IS AWARE AND WILL ADJUST ABX   IN VIEW OF THE NEW CX GROWING BACILLUS BURN WILL NOT CLOSE THE WOUND ,CONT PACKING QOD   N/V/I  ABX COMPLETE  DVT PROPHYLAXIS IN PLACE  REHAB IN PROGRESS  D/C PLAN PENDING BASED ON ID RECS   WOUND CARE UPON DISCHARGE WILL BE QOD XEROFORM PACKING KERLEX AND ACE.      POD#4 S/P I&D AND JOSE     T(C): 36.5 (11-05-21 @ 04:58), Max: 37.7 (11-04-21 @ 20:10)  HR: 95 (11-05-21 @ 08:01) (92 - 106)  BP: 106/59 (11-05-21 @ 08:01) (106/59 - 124/64)  RR: 16 (11-05-21 @ 08:01) (16 - 18)  SpO2: 98% (11-05-21 @ 08:01) (98% - 100%)            PTS PAIN IS CONTROLLED   WOUND LEFT POSTERO LATERAL WOUND IS GRANULATING WELL PACKED WITH XEROFORM 2.5 CM DEEP KERLEX ABD ACE   NEW CX FROM 11/3 BACILLUS IS ALSO GROWING , ID IS AWARE AND WILL ADJUST ABX   IN VIEW OF THE NEW CX GROWING BACILLUS BURN WILL NOT CLOSE THE WOUND ,CONT PACKING QOD   N/V/I  ABX COMPLETE  DVT PROPHYLAXIS IN PLACE  REHAB IN PROGRESS  D/C PLAN PENDING BASED ON ID RECS   WOUND CARE UPON DISCHARGE WILL BE QOD XEROFORM PACKING KERLEX AND ACE.   pt seen and examined  agree with plan

## 2021-11-05 NOTE — PROGRESS NOTE ADULT - SUBJECTIVE AND OBJECTIVE BOX
URBINAESPINAL, ABIMAEL  26y, Male  Allergy: No Known Allergies      LOS  11d    CHIEF COMPLAINT: osteomyelitis of the distal femur (05 Nov 2021 13:00)      INTERVAL EVENTS/HPI  - No acute events overnight  - T(F): , Max: 99.8 (11-04-21 @ 20:10)  - Denies any worsening symptoms  - Tolerating medication    ROS  General: Denies rigors, nightsweats  HEENT: Denies headache, rhinorrhea, sore throat, eye pain  CV: Denies CP, palpitations  PULM: Denies wheezing, hemoptysis  GI: Denies hematemesis, hematochezia, melena  : Denies discharge, hematuria  MSK: Denies arthralgias, myalgias  SKIN: Denies rash, lesions  NEURO: Denies paresthesias, weakness  PSYCH: Denies depression, anxiety    VITALS:  T(F): 97.1, Max: 99.8 (11-04-21 @ 20:10)  HR: 96  BP: 111/59  RR: 15Vital Signs Last 24 Hrs  T(C): 36.2 (05 Nov 2021 15:23), Max: 37.7 (04 Nov 2021 20:10)  T(F): 97.1 (05 Nov 2021 15:23), Max: 99.8 (04 Nov 2021 20:10)  HR: 96 (05 Nov 2021 15:23) (92 - 106)  BP: 111/59 (05 Nov 2021 15:23) (106/59 - 124/64)  BP(mean): 78 (05 Nov 2021 15:23) (78 - 78)  RR: 15 (05 Nov 2021 15:23) (15 - 18)  SpO2: 97% (05 Nov 2021 15:23) (97% - 100%)    PHYSICAL EXAM:  Gen: NAD, resting in bed  HEENT: Normocephalic, atraumatic  Neck: supple, no lymphadenopathy  CV: Regular rate & regular rhythm  Lungs: decreased BS at bases, no fremitus  Abdomen: Soft, BS present  Ext: Warm, well perfused  Neuro: non focal, awake  Skin: no rash, no erythema  Lines: no phlebitis    FH: Non-contributory  Social Hx: Non-contributory    TESTS & MEASUREMENTS:                  Culture - Other (collected 11-03-21 @ 15:58)  Source: .Other LLE  Preliminary Report (11-05-21 @ 08:35):    Moderate Bacillus species not anthracis "Susceptibilities not performed"    Culture - Body Fluid with Gram Stain (collected 11-01-21 @ 20:45)  Source: .Body Fluid LEFT FEMORALCANAL  Gram Stain (11-02-21 @ 15:21):    No polymorphonuclear leukocytes seen per low power field    Rare Gram positive cocci in pairs seen per oil power field  Preliminary Report (11-05-21 @ 11:31):    Moderate Enterococcus faecalis    Rare Staphylococcus aureus  Organism: Enterococcus faecalis (11-04-21 @ 13:18)  Organism: Enterococcus faecalis (11-04-21 @ 13:18)      -  Ampicillin: S <=2 Predicts results to ampicillin/sulbactam, amoxacillin-clavulanate and  piperacillin-tazobactam.      -  Tetra/Doxy: S <=1      -  Vancomycin: S 2      Method Type: GARFIELD    Culture - Fungal, Other (collected 11-01-21 @ 20:45)  Source: .Other  LEFT FEMORALCANAL  Preliminary Report (11-03-21 @ 06:53):    Testing in progress    Culture - Other (collected 10-25-21 @ 20:29)  Source: .Other left knee  Final Report (10-28-21 @ 12:50):    Moderate Enterococcus faecalis  Organism: Enterococcus faecalis (10-28-21 @ 12:50)  Organism: Enterococcus faecalis (10-28-21 @ 12:50)      -  Ampicillin: S <=2 Predicts results to ampicillin/sulbactam, amoxacillin-clavulanate and  piperacillin-tazobactam.      -  Tetra/Doxy: S <=4      -  Vancomycin: S 2      Method Type: GARFIELD    Culture - Acid Fast - Other w/Smear (collected 10-25-21 @ 19:59)  Source: .Other LEFT LEG  Preliminary Report (11-03-21 @ 15:04):    No growth at 1 week.    Culture - Fungal, Other (collected 10-25-21 @ 19:59)  Source: .Other LEFT LEG  Preliminary Report (11-03-21 @ 15:02):    No growth    Culture - Abscess with Gram Stain (collected 10-25-21 @ 19:59)  Source: .Abscess LEFT LEG  Final Report (10-31-21 @ 08:11):    Normal skin jas isolated    Culture - Fungal, Other (collected 10-25-21 @ 19:58)  Source: .Other LEFT LEG  Preliminary Report (11-03-21 @ 15:02):    No growth    Culture - Acid Fast - Other w/Smear (collected 10-25-21 @ 19:58)  Source: .Other LEFT LEG  Preliminary Report (11-03-21 @ 15:05):    No growth at 1 week.    Culture - Abscess with Gram Stain (collected 10-25-21 @ 19:58)  Source: .Abscess LEFT LEG  Final Report (10-31-21 @ 08:22):    Few Enterococcus faecalis  Organism: Enterococcus faecalis (10-31-21 @ 08:22)  Organism: Enterococcus faecalis (10-31-21 @ 08:22)      -  Ampicillin: S <=2 Predicts results to ampicillin/sulbactam, amoxacillin-clavulanate and  piperacillin-tazobactam.      -  Tetra/Doxy: S <=1      -  Vancomycin: S 2      Method Type: GARFIELD    Culture - Blood (collected 10-20-21 @ 20:00)  Source: .Blood Blood-Peripheral  Final Report (10-26-21 @ 01:00):    No Growth Final    Culture - Blood (collected 10-20-21 @ 19:58)  Source: .Blood Blood-Peripheral  Final Report (10-26-21 @ 01:00):    No Growth Final            INFECTIOUS DISEASES TESTING  COVID-19 PCR: NotDetec (10-31-21 @ 16:40)  COVID-19 PCR: NotDetec (10-25-21 @ 00:55)  COVID-19 PCR: NotDetec (10-20-21 @ 17:58)      INFLAMMATORY MARKERS  Sedimentation Rate, Erythrocyte: 39 mm/Hr (10-27-21 @ 06:00)  C-Reactive Protein, Serum: 9 mg/L (10-27-21 @ 06:00)  Sedimentation Rate, Erythrocyte: 18 mm/Hr (10-24-21 @ 22:10)  C-Reactive Protein, Serum: 25 mg/L (10-24-21 @ 22:10)      RADIOLOGY & ADDITIONAL TESTS:  I have personally reviewed the last available Chest xray  CXR      CT      CARDIOLOGY TESTING  12 Lead ECG:   Ventricular Rate 87 BPM    Atrial Rate 87 BPM    P-R Interval 140 ms    QRS Duration 90 ms    Q-T Interval 338 ms    QTC Calculation(Bazett) 406 ms    P Axis 51 degrees    R Axis 77 degrees    T Axis 5 degrees    Diagnosis Line Normal sinus rhythm  Nonspecific T wave abnormality  Abnormal ECG    Confirmed by ISABELLE CROCKETT MD ([id number]) on 10/24/2021 10:53:00 PM (10-24-21 @ 22:25)      MEDICATIONS  ampicillin  IVPB 2 IV Intermittent every 4 hours  enoxaparin Injectable 40 SubCutaneous daily  multivitamin 1 Oral daily      WEIGHT  Weight (kg): 107.5 (11-01-21 @ 18:31)      ANTIBIOTICS:  ampicillin  IVPB 2 Gram(s) IV Intermittent every 4 hours      All available historical records have been reviewed

## 2021-11-05 NOTE — PROGRESS NOTE ADULT - ASSESSMENT
ASSESSMENT  26yMale presents with pain in Left leg. Has soft tissue infection with assocated abscess    IMPRESSION  #Left Lower Extremity soft tissue infection/possible OM in setting of intramedullary mp fixation   - s/p IMN mp fixation of femoral shaft fracture   - CT Lower Extremity w/ IV Cont, Left (10.20.21 @ 22:50): Lucency adjacent to the distal mp and screws is demonstrated at multiple levels. Cortical disruption is noted at multiple locations, most prominently in the distal posterior femoral shaft (series 7 image 391). Large, ill-defined abscess extending from the medullary canal of the left femur into the surrounding soft tissues including the lateral thigh musculature and subcutaneous tissues (series 7 image 381). The dominant subcutaneous collectionis noted lateral to the distal femoral shaft just above the knee measuring up to 9 x 2 enhanced centimeters.  - s/p I and D of left leg absces (about 6 x 6 cm in size)  -- Wound Cx with Enterococcus species   - s/p debridement of left femur and removal of deep implant (IMN nail and screw)  11/1 -- growing E faecalis and Staph aureus     #Obesity BMI (kg/m2): 35, 35  #Abx allergy: NKDA      RECOMMENDATIONS  - as staph growing -- please start daptomycin 10 mg/kg q 24 hours -- check CK  - continue ampicillin 2g q 4 hours  - follow-up staph aureus susecptibilities  - will need at least 6 weeks from time of hardware removal     Please call or message on [employer] Teams if with any questions.  Spectra [phone]

## 2021-11-06 LAB — CK SERPL-CCNC: 224 U/L — SIGNIFICANT CHANGE UP (ref 0–225)

## 2021-11-06 RX ADMIN — Medication 216 GRAM(S): at 02:32

## 2021-11-06 RX ADMIN — DAPTOMYCIN 140 MILLIGRAM(S): 500 INJECTION, POWDER, LYOPHILIZED, FOR SOLUTION INTRAVENOUS at 22:30

## 2021-11-06 RX ADMIN — Medication 216 GRAM(S): at 13:13

## 2021-11-06 RX ADMIN — Medication 216 GRAM(S): at 11:22

## 2021-11-06 RX ADMIN — Medication 216 GRAM(S): at 05:46

## 2021-11-06 RX ADMIN — Medication 216 GRAM(S): at 21:03

## 2021-11-06 RX ADMIN — ENOXAPARIN SODIUM 40 MILLIGRAM(S): 100 INJECTION SUBCUTANEOUS at 11:23

## 2021-11-06 RX ADMIN — Medication 1 TABLET(S): at 11:22

## 2021-11-06 RX ADMIN — Medication 216 GRAM(S): at 17:42

## 2021-11-06 NOTE — PROGRESS NOTE ADULT - SUBJECTIVE AND OBJECTIVE BOX
Orthopaedics Progress Note    ABIMAEL URBINAESPINAL  [id number]    Patient is a 26y year old Male POD#5 S/P I&D AND JOSE. Daptomycin added to antibx regimen for bacillus    acetaminophen     Tablet .. 650 milliGRAM(s) Oral every 6 hours PRN  ampicillin  IVPB 2 Gram(s) IV Intermittent every 4 hours  DAPTOmycin IVPB 1000 milliGRAM(s) IV Intermittent every 24 hours  diphenhydrAMINE 25 milliGRAM(s) Oral at bedtime PRN  enoxaparin Injectable 40 milliGRAM(s) SubCutaneous daily  HYDROmorphone  Injectable 1 milliGRAM(s) IV Push every 10 minutes PRN  HYDROmorphone  Injectable 0.5 milliGRAM(s) IV Push every 10 minutes PRN  magnesium hydroxide Suspension 30 milliLiter(s) Oral daily PRN  multivitamin 1 Tablet(s) Oral daily  ondansetron Injectable 4 milliGRAM(s) IV Push every 6 hours PRN  ondansetron Injectable 4 milliGRAM(s) IV Push once PRN  oxyCODONE    IR 5 milliGRAM(s) Oral every 4 hours PRN  oxyCODONE    IR 5 milliGRAM(s) Oral daily PRN      T(C): 37.2 (11-06-21 @ 05:00), Max: 37.2 (11-06-21 @ 05:00)  HR: 88 (11-06-21 @ 05:00) (88 - 96)  BP: 121/57 (11-06-21 @ 05:00) (106/59 - 121/57)  RR: 18 (11-06-21 @ 05:00) (15 - 18)  SpO2: 98% (11-06-21 @ 05:00) (97% - 98%)    Physical Exam  NAD  Breathing comfortably on RA  Resting comfortably      LLE  Dressing c/d/i  Motor: TA/EHL/Gastroc intact  Sensory: SP/DP/Nieves/Sa intact  Vasc: foot WWP, 2+ DP pulse    Labs      26M with the above    PTS PAIN IS CONTROLLED   WOUND LEFT POSTERO LATERAL WOUND IS GRANULATING WELL PACKED WITH XEROFORM 2.5 CM DEEP KERLEX ABD ACE   NEW CX FROM 11/3 BACILLUS IS ALSO GROWING , ID IS AWARE AND rec dapto  IN VIEW OF THE NEW CX GROWING BACILLUS BURN WILL NOT CLOSE THE WOUND ,CONT PACKING QOD   N/V/I  ABX COMPLETE  DVT PROPHYLAXIS IN PLACE  REHAB IN PROGRESS  D/C PLAN PENDING BASED ON ID RECS   WOUND CARE UPON DISCHARGE WILL BE QOD XEROFORM PACKING KERLEX AND ACE.   pt seen and examined  agree with plan

## 2021-11-07 RX ADMIN — Medication 216 GRAM(S): at 05:04

## 2021-11-07 RX ADMIN — Medication 216 GRAM(S): at 17:11

## 2021-11-07 RX ADMIN — Medication 216 GRAM(S): at 01:50

## 2021-11-07 RX ADMIN — Medication 216 GRAM(S): at 21:14

## 2021-11-07 RX ADMIN — Medication 216 GRAM(S): at 09:41

## 2021-11-07 RX ADMIN — OXYCODONE HYDROCHLORIDE 5 MILLIGRAM(S): 5 TABLET ORAL at 13:29

## 2021-11-07 RX ADMIN — ENOXAPARIN SODIUM 40 MILLIGRAM(S): 100 INJECTION SUBCUTANEOUS at 11:15

## 2021-11-07 RX ADMIN — Medication 1 TABLET(S): at 11:15

## 2021-11-07 RX ADMIN — DAPTOMYCIN 140 MILLIGRAM(S): 500 INJECTION, POWDER, LYOPHILIZED, FOR SOLUTION INTRAVENOUS at 23:17

## 2021-11-07 RX ADMIN — Medication 216 GRAM(S): at 13:20

## 2021-11-07 RX ADMIN — OXYCODONE HYDROCHLORIDE 5 MILLIGRAM(S): 5 TABLET ORAL at 14:08

## 2021-11-07 NOTE — PROVIDER CONTACT NOTE (OTHER) - ACTION/TREATMENT ORDERED:
MD Lee notified and aware
pain medication administered as ordered. Will reassess heart rate
Reassess in 30 minutes

## 2021-11-07 NOTE — PROGRESS NOTE ADULT - ASSESSMENT
ASSESSMENT  26yMale presents with pain in Left leg. Has soft tissue infection with assocated abscess    IMPRESSION  #Left Lower Extremity soft tissue infection/possible OM in setting of intramedullary mp fixation   - s/p IMN mp fixation of femoral shaft fracture   - CT Lower Extremity w/ IV Cont, Left (10.20.21 @ 22:50): Lucency adjacent to the distal mp and screws is demonstrated at multiple levels. Cortical disruption is noted at multiple locations, most prominently in the distal posterior femoral shaft (series 7 image 391). Large, ill-defined abscess extending from the medullary canal of the left femur into the surrounding soft tissues including the lateral thigh musculature and subcutaneous tissues (series 7 image 381). The dominant subcutaneous collectionis noted lateral to the distal femoral shaft just above the knee measuring up to 9 x 2 enhanced centimeters.  - s/p I and D of left leg absces (about 6 x 6 cm in size)  -- Wound Cx with Enterococcus species   - s/p debridement of left femur and removal of deep implant (IMN nail and screw)  11/1 -- growing E faecalis and MRSA  -     #Obesity BMI (kg/m2): 35, 35  #Abx allergy: NKDA      RECOMMENDATIONS  - daptomycin 10 mg/kg q 24 hours which covers both Enterococcus and MRSA -- please discuss with CM on Monday for coverage -- daptomycin more convenient to dose vs. vancomycin (will likely need q8 dosing if vancomycin used)   - check CK weekly (next due 11/13)   - plan for 6 weeks (11/5-12/16)    Please call or message on [employer] Teams if with any questions.  Spectra [phone]

## 2021-11-07 NOTE — PROVIDER CONTACT NOTE (OTHER) - SITUATION
Pt tachycardic
Pt tachy@104  BP: 139/83  Pt ambulated around unit, no pain or distress noted
patient tachycardic at 106
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
## 2021-11-07 NOTE — PROGRESS NOTE ADULT - SUBJECTIVE AND OBJECTIVE BOX
URBINAESPINAL, ABIMAEL  26y, Male  Allergy: No Known Allergies      LOS  13d    CHIEF COMPLAINT: osteomyelitis of the distal femur (05 Nov 2021 13:00)      INTERVAL EVENTS/HPI  - No acute events overnight  - T(F): , Max: 98.3 (11-06-21 @ 17:10)  - Denies any worsening symptoms  - Tolerating medication    ROS  General: Denies rigors, nightsweats  HEENT: Denies headache, rhinorrhea, sore throat, eye pain  CV: Denies CP, palpitations  PULM: Denies wheezing, hemoptysis  GI: Denies hematemesis, hematochezia, melena  : Denies discharge, hematuria  MSK: Denies arthralgias, myalgias  SKIN: Denies rash, lesions  NEURO: Denies paresthesias, weakness  PSYCH: Denies depression, anxiety    VITALS:  T(F): 96.7, Max: 98.3 (11-06-21 @ 17:10)  HR: 86  BP: 112/60  RR: 18Vital Signs Last 24 Hrs  T(C): 35.9 (07 Nov 2021 05:00), Max: 36.8 (06 Nov 2021 14:01)  T(F): 96.7 (07 Nov 2021 05:00), Max: 98.3 (06 Nov 2021 17:10)  HR: 86 (07 Nov 2021 05:00) (81 - 99)  BP: 112/60 (07 Nov 2021 05:00) (104/58 - 148/70)  BP(mean): --  RR: 18 (07 Nov 2021 05:00) (18 - 18)  SpO2: 98% (07 Nov 2021 05:00) (96% - 99%)    PHYSICAL EXAM:  Gen: NAD, resting in bed  HEENT: Normocephalic, atraumatic  Neck: supple, no lymphadenopathy  CV: Regular rate & regular rhythm  Lungs: decreased BS at bases, no fremitus  Abdomen: Soft, BS present  Ext: left leg dressed  Neuro: non focal, awake  Skin: no rash, no erythema  Lines: no phlebitis    FH: Non-contributory  Social Hx: Non-contributory    TESTS & MEASUREMENTS:                  Culture - Other (collected 11-03-21 @ 15:58)  Source: .Other LLE  Preliminary Report (11-05-21 @ 08:35):    Moderate Bacillus species not anthracis "Susceptibilities not performed"    Culture - Body Fluid with Gram Stain (collected 11-01-21 @ 20:45)  Source: .Body Fluid LEFT FEMORALCANAL  Gram Stain (11-02-21 @ 15:21):    No polymorphonuclear leukocytes seen per low power field    Rare Gram positive cocci in pairs seen per oil power field  Preliminary Report (11-06-21 @ 10:33):    Moderate Enterococcus faecalis    Rare Methicillin Resistant Staphylococcus aureus  Organism: Enterococcus faecalis  Methicillin resistant Staphylococcus aureus (11-06-21 @ 10:32)  Organism: Methicillin resistant Staphylococcus aureus (11-06-21 @ 10:32)      -  Ampicillin/Sulbactam: R 16/8      -  Cefazolin: R <=4      -  Clindamycin: S <=0.25      -  Daptomycin: S 1      -  Erythromycin: R >4      -  Gentamicin: S <=1 Should not be used as monotherapy      -  Linezolid: S 2      -  Oxacillin: R >2      -  Penicillin: R >8      -  RIF- Rifampin: S <=1 Should not be used as monotherapy      -  Tetra/Doxy: S <=1      -  Trimethoprim/Sulfamethoxazole: S <=0.5/9.5      -  Vancomycin: S 2      Method Type: GARFIELD  Organism: Enterococcus faecalis (11-04-21 @ 13:18)      -  Ampicillin: S <=2 Predicts results to ampicillin/sulbactam, amoxacillin-clavulanate and  piperacillin-tazobactam.      -  Tetra/Doxy: S <=1      -  Vancomycin: S 2      Method Type: GARFIELD    Culture - Fungal, Other (collected 11-01-21 @ 20:45)  Source: .Other  LEFT FEMORALCANAL  Preliminary Report (11-03-21 @ 06:53):    Testing in progress    Culture - Other (collected 10-25-21 @ 20:29)  Source: .Other left knee  Final Report (10-28-21 @ 12:50):    Moderate Enterococcus faecalis  Organism: Enterococcus faecalis (10-28-21 @ 12:50)  Organism: Enterococcus faecalis (10-28-21 @ 12:50)      -  Ampicillin: S <=2 Predicts results to ampicillin/sulbactam, amoxacillin-clavulanate and  piperacillin-tazobactam.      -  Tetra/Doxy: S <=4      -  Vancomycin: S 2      Method Type: GARFIELD    Culture - Acid Fast - Other w/Smear (collected 10-25-21 @ 19:59)  Source: .Other LEFT LEG  Preliminary Report (11-03-21 @ 15:04):    No growth at 1 week.    Culture - Fungal, Other (collected 10-25-21 @ 19:59)  Source: .Other LEFT LEG  Preliminary Report (11-03-21 @ 15:02):    No growth    Culture - Abscess with Gram Stain (collected 10-25-21 @ 19:59)  Source: .Abscess LEFT LEG  Final Report (10-31-21 @ 08:11):    Normal skin jas isolated    Culture - Fungal, Other (collected 10-25-21 @ 19:58)  Source: .Other LEFT LEG  Preliminary Report (11-03-21 @ 15:02):    No growth    Culture - Acid Fast - Other w/Smear (collected 10-25-21 @ 19:58)  Source: .Other LEFT LEG  Preliminary Report (11-03-21 @ 15:05):    No growth at 1 week.    Culture - Abscess with Gram Stain (collected 10-25-21 @ 19:58)  Source: .Abscess LEFT LEG  Final Report (10-31-21 @ 08:22):    Few Enterococcus faecalis  Organism: Enterococcus faecalis (10-31-21 @ 08:22)  Organism: Enterococcus faecalis (10-31-21 @ 08:22)      -  Ampicillin: S <=2 Predicts results to ampicillin/sulbactam, amoxacillin-clavulanate and  piperacillin-tazobactam.      -  Tetra/Doxy: S <=1      -  Vancomycin: S 2      Method Type: GARFIELD    Culture - Blood (collected 10-20-21 @ 20:00)  Source: .Blood Blood-Peripheral  Final Report (10-26-21 @ 01:00):    No Growth Final    Culture - Blood (collected 10-20-21 @ 19:58)  Source: .Blood Blood-Peripheral  Final Report (10-26-21 @ 01:00):    No Growth Final            INFECTIOUS DISEASES TESTING  COVID-19 PCR: NotDetec (10-31-21 @ 16:40)  COVID-19 PCR: NotDetec (10-25-21 @ 00:55)  COVID-19 PCR: NotDetec (10-20-21 @ 17:58)      INFLAMMATORY MARKERS  Sedimentation Rate, Erythrocyte: 39 mm/Hr (10-27-21 @ 06:00)  C-Reactive Protein, Serum: 9 mg/L (10-27-21 @ 06:00)  Sedimentation Rate, Erythrocyte: 18 mm/Hr (10-24-21 @ 22:10)  C-Reactive Protein, Serum: 25 mg/L (10-24-21 @ 22:10)      RADIOLOGY & ADDITIONAL TESTS:  I have personally reviewed the last available Chest xray  CXR      CT      CARDIOLOGY TESTING  12 Lead ECG:   Ventricular Rate 87 BPM    Atrial Rate 87 BPM    P-R Interval 140 ms    QRS Duration 90 ms    Q-T Interval 338 ms    QTC Calculation(Bazett) 406 ms    P Axis 51 degrees    R Axis 77 degrees    T Axis 5 degrees    Diagnosis Line Normal sinus rhythm  Nonspecific T wave abnormality  Abnormal ECG    Confirmed by ISABELLE CROCKETT MD ([id number]) on 10/24/2021 10:53:00 PM (10-24-21 @ 22:25)      MEDICATIONS  ampicillin  IVPB 2 IV Intermittent every 4 hours  DAPTOmycin IVPB 1000 IV Intermittent every 24 hours  enoxaparin Injectable 40 SubCutaneous daily  multivitamin 1 Oral daily      WEIGHT  Weight (kg): 107.5 (11-01-21 @ 18:31)      ANTIBIOTICS:  ampicillin  IVPB 2 Gram(s) IV Intermittent every 4 hours  DAPTOmycin IVPB 1000 milliGRAM(s) IV Intermittent every 24 hours      All available historical records have been reviewed

## 2021-11-08 ENCOUNTER — TRANSCRIPTION ENCOUNTER (OUTPATIENT)
Age: 26
End: 2021-11-08

## 2021-11-08 VITALS — HEART RATE: 90 BPM

## 2021-11-08 LAB
ALBUMIN SERPL ELPH-MCNC: 4.3 G/DL — SIGNIFICANT CHANGE UP (ref 3.5–5.2)
ALP SERPL-CCNC: 108 U/L — SIGNIFICANT CHANGE UP (ref 30–115)
ALT FLD-CCNC: 23 U/L — SIGNIFICANT CHANGE UP (ref 0–41)
ANION GAP SERPL CALC-SCNC: 18 MMOL/L — HIGH (ref 7–14)
AST SERPL-CCNC: 17 U/L — SIGNIFICANT CHANGE UP (ref 0–41)
BILIRUB SERPL-MCNC: 0.5 MG/DL — SIGNIFICANT CHANGE UP (ref 0.2–1.2)
BUN SERPL-MCNC: 11 MG/DL — SIGNIFICANT CHANGE UP (ref 10–20)
CALCIUM SERPL-MCNC: 9.5 MG/DL — SIGNIFICANT CHANGE UP (ref 8.5–10.1)
CHLORIDE SERPL-SCNC: 100 MMOL/L — SIGNIFICANT CHANGE UP (ref 98–110)
CO2 SERPL-SCNC: 21 MMOL/L — SIGNIFICANT CHANGE UP (ref 17–32)
CREAT SERPL-MCNC: 0.6 MG/DL — LOW (ref 0.7–1.5)
CULTURE RESULTS: SIGNIFICANT CHANGE UP
ERYTHROCYTE [SEDIMENTATION RATE] IN BLOOD: 106 MM/HR — HIGH (ref 0–10)
GLUCOSE SERPL-MCNC: 106 MG/DL — HIGH (ref 70–99)
HCT VFR BLD CALC: 32.2 % — LOW (ref 42–52)
HGB BLD-MCNC: 10.2 G/DL — LOW (ref 14–18)
MCHC RBC-ENTMCNC: 26.4 PG — LOW (ref 27–31)
MCHC RBC-ENTMCNC: 31.7 G/DL — LOW (ref 32–37)
MCV RBC AUTO: 83.4 FL — SIGNIFICANT CHANGE UP (ref 80–94)
NRBC # BLD: 0 /100 WBCS — SIGNIFICANT CHANGE UP (ref 0–0)
PLATELET # BLD AUTO: 320 K/UL — SIGNIFICANT CHANGE UP (ref 130–400)
POTASSIUM SERPL-MCNC: 4.2 MMOL/L — SIGNIFICANT CHANGE UP (ref 3.5–5)
POTASSIUM SERPL-SCNC: 4.2 MMOL/L — SIGNIFICANT CHANGE UP (ref 3.5–5)
PROT SERPL-MCNC: 7.1 G/DL — SIGNIFICANT CHANGE UP (ref 6–8)
RBC # BLD: 3.86 M/UL — LOW (ref 4.7–6.1)
RBC # FLD: 15.8 % — HIGH (ref 11.5–14.5)
SODIUM SERPL-SCNC: 139 MMOL/L — SIGNIFICANT CHANGE UP (ref 135–146)
SPECIMEN SOURCE: SIGNIFICANT CHANGE UP
WBC # BLD: 9.4 K/UL — SIGNIFICANT CHANGE UP (ref 4.8–10.8)
WBC # FLD AUTO: 9.4 K/UL — SIGNIFICANT CHANGE UP (ref 4.8–10.8)

## 2021-11-08 PROCEDURE — 99231 SBSQ HOSP IP/OBS SF/LOW 25: CPT

## 2021-11-08 RX ORDER — DAPTOMYCIN 500 MG/10ML
1000 INJECTION, POWDER, LYOPHILIZED, FOR SOLUTION INTRAVENOUS
Qty: 0 | Refills: 0 | DISCHARGE
Start: 2021-11-08

## 2021-11-08 RX ORDER — ACETAMINOPHEN 500 MG
2 TABLET ORAL
Qty: 112 | Refills: 0
Start: 2021-11-08 | End: 2021-11-21

## 2021-11-08 RX ORDER — PANTOPRAZOLE SODIUM 20 MG/1
1 TABLET, DELAYED RELEASE ORAL
Qty: 30 | Refills: 0
Start: 2021-11-08 | End: 2021-12-07

## 2021-11-08 RX ORDER — ASPIRIN/CALCIUM CARB/MAGNESIUM 324 MG
1 TABLET ORAL
Qty: 30 | Refills: 0
Start: 2021-11-08 | End: 2021-12-07

## 2021-11-08 RX ADMIN — DAPTOMYCIN 140 MILLIGRAM(S): 500 INJECTION, POWDER, LYOPHILIZED, FOR SOLUTION INTRAVENOUS at 16:58

## 2021-11-08 RX ADMIN — Medication 216 GRAM(S): at 01:46

## 2021-11-08 RX ADMIN — ENOXAPARIN SODIUM 40 MILLIGRAM(S): 100 INJECTION SUBCUTANEOUS at 11:28

## 2021-11-08 RX ADMIN — Medication 1 TABLET(S): at 11:28

## 2021-11-08 RX ADMIN — Medication 216 GRAM(S): at 05:22

## 2021-11-08 NOTE — DISCHARGE NOTE PROVIDER - CARE PROVIDER_API CALL
Louis Norris)  Orthopaedic Surgery  [address] [place] [address]  Phone: [phone]  Fax: [phone]  Follow Up Time: 1 week    Cruz Murray)  Plastic Surgery  [address] [place] [address]  Phone: [phone]  Fax: [phone]  Follow Up Time: 1 week    Marquise Paez)  Internal Medicine  [address] [place] [address]  Phone: [phone]  Fax: [phone]  Follow Up Time: 1 week

## 2021-11-08 NOTE — PROGRESS NOTE ADULT - PROVIDER SPECIALTY LIST ADULT
Infectious Disease
Orthopedics
Burn
Burn
Infectious Disease
Infectious Disease
Orthopedics
Infectious Disease
Orthopedics
Infectious Disease
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
## 2021-11-08 NOTE — PROGRESS NOTE ADULT - SUBJECTIVE AND OBJECTIVE BOX
s/p left femur i/d and removal of imn pod 7  pt seen at bedside no complaints pain controlled   dressing and packing placed yesterday     Vital Signs Last 24 Hrs  T(C): 36.4 (08 Nov 2021 05:00), Max: 37.3 (07 Nov 2021 16:37)  T(F): 97.6 (08 Nov 2021 05:00), Max: 99.2 (07 Nov 2021 16:37)  HR: 88 (08 Nov 2021 05:00) (85 - 104)  BP: 109/61 (08 Nov 2021 05:00) (109/61 - 140/78)  BP(mean): --  RR: 18 (08 Nov 2021 05:00) (18 - 18)  SpO2: 100% (08 Nov 2021 05:00) (98% - 100%)    left femur : dressing in place c/d/i   nvid   calf soft nt
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
## 2021-11-08 NOTE — DISCHARGE NOTE PROVIDER - NSDCMRMEDTOKEN_GEN_ALL_CORE_FT
acetaminophen 325 mg oral tablet: 2 tab(s) orally every 6 hours, As needed, Temp greater or equal to 38C (100.4F), Mild Pain (1 - 3), Moderate Pain (4 - 6) MDD:8  Aspirin Enteric Coated 325 mg oral delayed release tablet: 1 tab(s) orally once a day MDD:1  DAPTOmycin 500 mg intravenous injection: 1000 milligram(s) intravenous every 24 hours  pantoprazole 20 mg oral delayed release tablet: 1 tab(s) orally once a day

## 2021-11-08 NOTE — PROGRESS NOTE ADULT - REASON FOR ADMISSION
osteomyelitis of the distal femur
sasha osteo distal femur
Left knee abscess
left thigh - abscess  infected hardware

## 2021-11-08 NOTE — PROGRESS NOTE ADULT - TIME BILLING
I have personally seen and examined this patient.    I have reviewed all pertinent clinical information and reviewed all relevant imaging and diagnostic studies personally.   I counseled the patient about diagnostic testing and treatment plan. All questions were answered.   I discussed recommendations with the primary team.
I have personally seen and examined this patient.    I have reviewed all pertinent clinical information and reviewed all relevant imaging and diagnostic studies personally.   I counseled the patient about diagnostic testing and treatment plan. All questions were answered.   I discussed recommendations with the primary team.
wound care
I have personally seen and examined this patient.    I have reviewed all pertinent clinical information and reviewed all relevant imaging and diagnostic studies personally.   I counseled the patient about diagnostic testing and treatment plan. All questions were answered.   I discussed recommendations with the primary team.
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
## 2021-11-08 NOTE — PROGRESS NOTE ADULT - ASSESSMENT
ASSESSMENT  26yMale presents with pain in Left leg. Has soft tissue infection with assocated abscess    IMPRESSION  #Left Lower Extremity soft tissue infection/possible OM in setting of intramedullary mp fixation   - s/p IMN mp fixation of femoral shaft fracture   - CT Lower Extremity w/ IV Cont, Left (10.20.21 @ 22:50): Lucency adjacent to the distal mp and screws is demonstrated at multiple levels. Cortical disruption is noted at multiple locations, most prominently in the distal posterior femoral shaft (series 7 image 391). Large, ill-defined abscess extending from the medullary canal of the left femur into the surrounding soft tissues including the lateral thigh musculature and subcutaneous tissues (series 7 image 381). The dominant subcutaneous collectionis noted lateral to the distal femoral shaft just above the knee measuring up to 9 x 2 enhanced centimeters.  - s/p I and D of left leg absces (about 6 x 6 cm in size)  -- Wound Cx with Enterococcus species   - s/p debridement of left femur and removal of deep implant (IMN nail and screw)  11/1 -- growing E faecalis and MRSA  - wound Cx 11/3 - Bacillus species - likely contaminant     Sedimentation Rate, Erythrocyte: 40 mm/Hr (10.21.21 @ 04:00)  C-Reactive Protein, Serum: 17 mg/L (10.21.21 @ 04:00)      #Obesity BMI (kg/m2): 35, 35  #Abx allergy: NKDA      RECOMMENDATIONS  - daptomycin 10 mg/kg q 24 hours which covers both Enterococcus and MRSA - plan for 6 weeks (11/5-12/16)    - Weekly CBC, CMP, ESR/CRP, CK  - will arrange ID follow-up with Dr. Mikhail Ochoa for Telehealth. We will call the patient between 10:30-1:30      [id number]        [phone]       Fax [phone]    Please call or message on [employer] Teams if with any questions.  [id number] [id number]

## 2021-11-08 NOTE — DISCHARGE NOTE PROVIDER - NSDCCAREPROVSEEN_GEN_ALL_CORE_FT
[facility] orthopedics  [facility] infectious disease  [facility] burn

## 2021-11-08 NOTE — DISCHARGE NOTE PROVIDER - NSDCCPTREATMENT_GEN_ALL_CORE_FT
PRINCIPAL PROCEDURE  Procedure: Removal of deep implant  Findings and Treatment: IMN nail and screws, CPT code 20680x3 (proximal interlock, disal interlock and IM nail)      SECONDARY PROCEDURE  Procedure: Debridement of left femur  Findings and Treatment: for osteomyelitis; CPT code 35093    Procedure: Incision and drainage of deep abscess of lower leg  Findings and Treatment:

## 2021-11-08 NOTE — DISCHARGE NOTE PROVIDER - PROVIDER TOKENS
PROVIDER:[TOKEN:[[id number]],FOLLOWUP:[1 week]],PROVIDER:[TOKEN:[[id number]],FOLLOWUP:[1 week]],PROVIDER:[TOKEN:[[id number]],FOLLOWUP:[1 week]]

## 2021-11-08 NOTE — DISCHARGE NOTE NURSING/CASE MANAGEMENT/SOCIAL WORK - PATIENT PORTAL LINK FT
You can access the FollowMyHealth Patient Portal offered by [facility] by registering at the following website: [email or web address]. By joining [employer]’s FollowMyHealth portal, you will also be able to view your health information using other applications (apps) compatible with our system.

## 2021-11-08 NOTE — DISCHARGE NOTE PROVIDER - CARE PROVIDERS DIRECT ADDRESSES
,[email or web address],[email or web address]

## 2021-11-08 NOTE — PROGRESS NOTE ADULT - SUBJECTIVE AND OBJECTIVE BOX
URBINAESPINAL, ABIMAEL  26y, Male  Allergy: No Known Allergies      LOS  14d    CHIEF COMPLAINT: left thigh - abscess  infected hardware (08 Nov 2021 12:23)      INTERVAL EVENTS/HPI  - No acute events overnight  - T(F): , Max: 98.9 (11-08-21 @ 00:45)  - Denies any worsening symptoms  - Tolerating medication  - WBC Count: 9.40 (11-08-21 @ 11:00)     - Creatinine, Serum: 0.6 (11-08-21 @ 11:00)       ROS  General: Denies rigors, nightsweats  HEENT: Denies headache, rhinorrhea, sore throat, eye pain  CV: Denies CP, palpitations  PULM: Denies wheezing, hemoptysis  GI: Denies hematemesis, hematochezia, melena  : Denies discharge, hematuria  MSK: Denies arthralgias, myalgias  SKIN: Denies rash, lesions  NEURO: Denies paresthesias, weakness  PSYCH: Denies depression, anxiety    VITALS:  T(F): 98.4, Max: 98.9 (11-08-21 @ 00:45)  HR: 104  BP: 121/66  RR: 18Vital Signs Last 24 Hrs  T(C): 36.9 (08 Nov 2021 17:00), Max: 37.2 (08 Nov 2021 00:45)  T(F): 98.4 (08 Nov 2021 17:00), Max: 98.9 (08 Nov 2021 00:45)  HR: 104 (08 Nov 2021 17:00) (87 - 104)  BP: 121/66 (08 Nov 2021 17:00) (109/61 - 128/64)  BP(mean): --  RR: 18 (08 Nov 2021 17:00) (18 - 18)  SpO2: 100% (08 Nov 2021 17:00) (99% - 100%)    PHYSICAL EXAM:  Gen: NAD, resting in bed  HEENT: Normocephalic, atraumatic  Neck: supple, no lymphadenopathy  CV: Regular rate & regular rhythm  Lungs: decreased BS at bases, no fremitus  Abdomen: Soft, BS present  Ext: Wleft lower extremity dressed   Neuro: non focal, awake  Skin: no rash, no erythema  Lines: no phlebitis    FH: Non-contributory  Social Hx: Non-contributory    TESTS & MEASUREMENTS:                        10.2   9.40  )-----------( 320      ( 08 Nov 2021 11:00 )             32.2     11-08    139  |  100  |  11  ----------------------------<  106<H>  4.2   |  21  |  0.6<L>    Ca    9.5      08 Nov 2021 11:00    TPro  7.1  /  Alb  4.3  /  TBili  0.5  /  DBili  x   /  AST  17  /  ALT  23  /  AlkPhos  108  11-08    eGFR if Non African American: 139 mL/min/1.73M2 (11-08-21 @ 11:00)  eGFR if : 161 mL/min/1.73M2 (11-08-21 @ 11:00)    LIVER FUNCTIONS - ( 08 Nov 2021 11:00 )  Alb: 4.3 g/dL / Pro: 7.1 g/dL / ALK PHOS: 108 U/L / ALT: 23 U/L / AST: 17 U/L / GGT: x               Culture - Other (collected 11-03-21 @ 15:58)  Source: .Other LLE  Final Report (11-08-21 @ 14:26):    Moderate Bacillus species not anthracis "Susceptibilities not performed"    Culture - Body Fluid with Gram Stain (collected 11-01-21 @ 20:45)  Source: .Body Fluid LEFT FEMORALCANAL  Gram Stain (11-02-21 @ 15:21):    No polymorphonuclear leukocytes seen per low power field    Rare Gram positive cocci in pairs seen per oil power field  Final Report (11-07-21 @ 09:10):    Moderate Enterococcus faecalis    Rare Methicillin Resistant Staphylococcus aureus  Organism: Enterococcus faecalis  Methicillin resistant Staphylococcus aureus (11-07-21 @ 09:10)  Organism: Methicillin resistant Staphylococcus aureus (11-07-21 @ 09:10)      -  Ampicillin/Sulbactam: R 16/8      -  Cefazolin: R <=4      -  Clindamycin: S <=0.25      -  Daptomycin: S 1      -  Erythromycin: R >4      -  Gentamicin: S <=1 Should not be used as monotherapy      -  Linezolid: S 2      -  Oxacillin: R >2      -  Penicillin: R >8      -  RIF- Rifampin: S <=1 Should not be used as monotherapy      -  Tetra/Doxy: S <=1      -  Trimethoprim/Sulfamethoxazole: S <=0.5/9.5      -  Vancomycin: S 2      Method Type: GARFIELD  Organism: Enterococcus faecalis (11-07-21 @ 09:10)      -  Ampicillin: S <=2 Predicts results to ampicillin/sulbactam, amoxacillin-clavulanate and  piperacillin-tazobactam.      -  Tetra/Doxy: S <=1      -  Vancomycin: S 2      Method Type: GARFIELD    Culture - Fungal, Other (collected 11-01-21 @ 20:45)  Source: .Other  LEFT FEMORALCANAL  Preliminary Report (11-03-21 @ 06:53):    Testing in progress    Culture - Other (collected 10-25-21 @ 20:29)  Source: .Other left knee  Final Report (10-28-21 @ 12:50):    Moderate Enterococcus faecalis  Organism: Enterococcus faecalis (10-28-21 @ 12:50)  Organism: Enterococcus faecalis (10-28-21 @ 12:50)      -  Ampicillin: S <=2 Predicts results to ampicillin/sulbactam, amoxacillin-clavulanate and  piperacillin-tazobactam.      -  Tetra/Doxy: S <=4      -  Vancomycin: S 2      Method Type: GARFIELD    Culture - Acid Fast - Other w/Smear (collected 10-25-21 @ 19:59)  Source: .Other LEFT LEG  Preliminary Report (11-03-21 @ 15:04):    No growth at 1 week.    Culture - Fungal, Other (collected 10-25-21 @ 19:59)  Source: .Other LEFT LEG  Preliminary Report (11-03-21 @ 15:02):    No growth    Culture - Abscess with Gram Stain (collected 10-25-21 @ 19:59)  Source: .Abscess LEFT LEG  Final Report (10-31-21 @ 08:11):    Normal skin jas isolated    Culture - Fungal, Other (collected 10-25-21 @ 19:58)  Source: .Other LEFT LEG  Preliminary Report (11-03-21 @ 15:02):    No growth    Culture - Acid Fast - Other w/Smear (collected 10-25-21 @ 19:58)  Source: .Other LEFT LEG  Preliminary Report (11-03-21 @ 15:05):    No growth at 1 week.    Culture - Abscess with Gram Stain (collected 10-25-21 @ 19:58)  Source: .Abscess LEFT LEG  Final Report (10-31-21 @ 08:22):    Few Enterococcus faecalis  Organism: Enterococcus faecalis (10-31-21 @ 08:22)  Organism: Enterococcus faecalis (10-31-21 @ 08:22)      -  Ampicillin: S <=2 Predicts results to ampicillin/sulbactam, amoxacillin-clavulanate and  piperacillin-tazobactam.      -  Tetra/Doxy: S <=1      -  Vancomycin: S 2      Method Type: GARFIELD    Culture - Blood (collected 10-20-21 @ 20:00)  Source: .Blood Blood-Peripheral  Final Report (10-26-21 @ 01:00):    No Growth Final    Culture - Blood (collected 10-20-21 @ 19:58)  Source: .Blood Blood-Peripheral  Final Report (10-26-21 @ 01:00):    No Growth Final            INFECTIOUS DISEASES TESTING  COVID-19 PCR: NotDetec (10-31-21 @ 16:40)  COVID-19 PCR: NotDetec (10-25-21 @ 00:55)  COVID-19 PCR: NotDetec (10-20-21 @ 17:58)      INFLAMMATORY MARKERS  Sedimentation Rate, Erythrocyte: 106 mm/Hr (11-08-21 @ 11:00)  Sedimentation Rate, Erythrocyte: 39 mm/Hr (10-27-21 @ 06:00)  C-Reactive Protein, Serum: 9 mg/L (10-27-21 @ 06:00)  Sedimentation Rate, Erythrocyte: 18 mm/Hr (10-24-21 @ 22:10)      RADIOLOGY & ADDITIONAL TESTS:  I have personally reviewed the last available Chest xray  CXR      CT      CARDIOLOGY TESTING      MEDICATIONS  DAPTOmycin IVPB 1000 IV Intermittent every 24 hours  enoxaparin Injectable 40 SubCutaneous daily  multivitamin 1 Oral daily      WEIGHT  Weight (kg): 107.5 (11-01-21 @ 18:31)  Creatinine, Serum: 0.6 mg/dL (11-08-21 @ 11:00)      ANTIBIOTICS:  DAPTOmycin IVPB 1000 milliGRAM(s) IV Intermittent every 24 hours      All available historical records have been reviewed

## 2021-11-08 NOTE — DISCHARGE NOTE PROVIDER - NSDCFUADDINST_GEN_ALL_CORE_FT
Weight bearing as tolerated.   Continue Aspirin 81mg twice a day x30 days to prevent blood clots.   Continue protonix 40mg once daily x30 days for GI prophylaxis.   Keep Wound Vac in place; follow up with  in [facility] in 1-2 weeks.   If any purulent/excessive drainage please contact  Clinic or  office.   Continue IV antibiotic infusions.  Follow up with  in 1 week  Follow up with Dr.De Sharma (infectiuos disease) in 1 week

## 2021-11-08 NOTE — DISCHARGE NOTE PROVIDER - NSDCCPCAREPLAN_GEN_ALL_CORE_FT
PRINCIPAL DISCHARGE DIAGNOSIS  Diagnosis: Infection of lower extremity associated with hardware  Assessment and Plan of Treatment:       SECONDARY DISCHARGE DIAGNOSES  Diagnosis: Osteomyelitis  Assessment and Plan of Treatment:     Diagnosis: Skin abscess  Assessment and Plan of Treatment:

## 2021-11-09 LAB — CRP SERPL-MCNC: 65 MG/L — HIGH

## 2021-11-15 PROBLEM — Z00.00 ENCOUNTER FOR PREVENTIVE HEALTH EXAMINATION: Status: ACTIVE | Noted: 2021-11-15

## 2021-11-16 ENCOUNTER — OUTPATIENT (OUTPATIENT)
Dept: OUTPATIENT SERVICES | Facility: HOSPITAL | Age: 26
LOS: 1 days | Discharge: HOME | End: 2021-11-16

## 2021-11-16 ENCOUNTER — APPOINTMENT (OUTPATIENT)
Dept: BURN CARE | Facility: CLINIC | Age: 26
End: 2021-11-16
Payer: COMMERCIAL

## 2021-11-16 DIAGNOSIS — T14.90XA INJURY, UNSPECIFIED, INITIAL ENCOUNTER: ICD-10-CM

## 2021-11-16 PROCEDURE — 99213 OFFICE O/P EST LOW 20 MIN: CPT

## 2021-11-17 NOTE — CDI QUERY NOTE - NSCDIOTHERTXTBX_GEN_ALL_CORE_HH
DOCUMENTATION CLARIFICATION FORM     Encounter #: [id number]                                                  Patient’s Name:  Francisco Herring  Medical Record #: [id number]                                       Admit Date: 10-  CDI Specialist/: Lynn                                       Contact #: [phone]    Dear Dr. Myers,                       Date: 11-                  The Physician’s or Provider’s documentation of the patient’s presentation, evaluation and  medical management, as identified below, may support a diagnosis that is not documented in the medical record.  In order to accurately capture all diagnoses to the greatest degree of specificity reflecting the patient’s actual severity of illness, the documentation in this patient’s medical record requires additional clarification.  Please include more specific documentation, either known or suspected, of a corresponding diagnosis associated with the clinical information described below in your Progress Note and/or Discharge Summary.    CLINICAL INDICATORS:  •	10-25 Operative Report: history of left femur intramedullary mp....had been draining from the distal lateral incision. PROCEDURE:...A lateral incision was made over the flaps of the femur. There was  slight tear. There was purulence that was expressed of approximately 5-10 mL. There was necrotic fatty tissue that was also identified. Extensive irrigation and debridement was performed, then copious amount of saline which was antibiotic saline was also irrigated.   •	10-25 Brief Op Note:... PROCEDURES: Incision and drainage of deep abscess of lower leg. Operative Findings:... Incision and drainage with irrigation and debridement performed over left leg abscess. Abscess was approximately [id number] in size; debrided to depth of approximately 3cm. approximately 5-10cc of purulent fluid expressed. Necrotic tissue debrided.    Query  Based on your professional judgment and the above clinical indicators, please clarify if debrided necrotic tissue can be further specified as:  •	Excisional debridement down to and including subcutaneous tissue  •	Excisional debridement down to and including muscle  •	Non-excisional debridement down to and including subcutaneous tissue  •	Non-excisional debridement down to and including muscle  •	Other(please specify)_________                                                      Documentation clarification is required for compliance, accuracy in coding and billing, and reporting severity of illness, quality data and risk of mortality.  --------------------------------------------------------------------------------------------------------------------------------------------  DO NOT REMOVE THIS RECORD WITHOUT FIRST NOTIFYING THE CDI SPECIALIST  This form is NOT a part of the permanent Medical Record.

## 2021-11-18 LAB — BACTERIA SPEC CULT: NORMAL

## 2021-11-30 ENCOUNTER — APPOINTMENT (OUTPATIENT)
Dept: BURN CARE | Facility: CLINIC | Age: 26
End: 2021-11-30
Payer: COMMERCIAL

## 2021-11-30 ENCOUNTER — OUTPATIENT (OUTPATIENT)
Dept: OUTPATIENT SERVICES | Facility: HOSPITAL | Age: 26
LOS: 1 days | Discharge: HOME | End: 2021-11-30

## 2021-11-30 DIAGNOSIS — T14.90XA INJURY, UNSPECIFIED, INITIAL ENCOUNTER: ICD-10-CM

## 2021-11-30 PROCEDURE — 99213 OFFICE O/P EST LOW 20 MIN: CPT

## 2021-12-21 ENCOUNTER — OUTPATIENT (OUTPATIENT)
Dept: OUTPATIENT SERVICES | Facility: HOSPITAL | Age: 26
LOS: 1 days | Discharge: HOME | End: 2021-12-21

## 2021-12-21 ENCOUNTER — APPOINTMENT (OUTPATIENT)
Dept: BURN CARE | Facility: CLINIC | Age: 26
End: 2021-12-21
Payer: COMMERCIAL

## 2021-12-21 DIAGNOSIS — Z87.81 PERSONAL HISTORY OF (HEALED) TRAUMATIC FRACTURE: ICD-10-CM

## 2021-12-21 PROCEDURE — 99213 OFFICE O/P EST LOW 20 MIN: CPT

## 2022-07-12 ENCOUNTER — APPOINTMENT (OUTPATIENT)
Dept: ORTHOPEDIC SURGERY | Facility: CLINIC | Age: 27
End: 2022-07-12

## 2022-07-12 ENCOUNTER — RESULT CHARGE (OUTPATIENT)
Age: 27
End: 2022-07-12

## 2022-07-12 PROCEDURE — 99213 OFFICE O/P EST LOW 20 MIN: CPT

## 2022-07-12 PROCEDURE — 73552 X-RAY EXAM OF FEMUR 2/>: CPT | Mod: LT

## 2022-07-12 NOTE — ASSESSMENT
[FreeTextEntry1] :  27-year-old gentleman returns for interval follow-up of a osteomyelitis of the left femur.  No signs of infection at this point time the patient can now follow up on an as-needed basis.  She is encouraged to continue with activities as tolerated.  If he had ever develops any symptoms of infection or problems he is welcome back in my office at any time.  All questions were answered to his satisfaction.

## 2022-07-12 NOTE — HISTORY OF PRESENT ILLNESS
[de-identified] :  27-year-old gentleman returns for final follow-up evaluation for osteomyelitis.  November 2021 the patient developed increasing drainage from the back of his knee.  Subsequent workup suggested osteomyelitis about her previously placed IM nail.  He underwent removal of hardware and debridement the affected area and was placed on a prolonged course of antibiotics.  He has been off antibiotics for the better part of 6 months.  He notes that all of his pain is resolved.  He has no drainage.  No limitations and if anything feels that his leg is the best it has felt since he initially had his mp placed for an open femur fracture.  He returns today for interval check.  He obtained a baseline blood test in May of this year.  The results which are in the chart but are significant for a normal white count (5.7) normal ESR (2 with normal limits being 0-20) and a normal CRP (2 with normal limits 0-20).

## 2022-07-12 NOTE — IMAGING
[de-identified] :   Pleasant young man sits and stands comfortably my office no distress.  He walks in with no antalgia.  He is able get on the exam table without assistance.\par \par Physical examination:\par -left knee and thigh:  Well-healed surgical scars from his prior injury in from his room most recent debridement.  I am able to range his hip and knee without limitations.  Muscle mass about his knee has reconstituted.  The no route geniculate lymph nodes.  No lymph nodes in the inguinal crease.\par \par Radiographs:\par -left femur (AP, lateral):  Healed left femur fracture.  Prior hardware has been removed.  No new erosive changes are noted.\par \par Laboratory:\par White count 5.7\par ESR 2\par CRP 2

## 2023-05-04 NOTE — ED PROVIDER NOTE - IV ALTEPLASE EXCL ABS HIDDEN
show Mercedes Flap Text: The defect edges were debeveled with a #15 scalpel blade. Given the location of the defect, shape of the defect and the proximity to free margins a Mercedes flap was deemed most appropriate. Using a sterile surgical marker, an appropriate advancement flap was drawn incorporating the defect and placing the expected incisions within the relaxed skin tension lines where possible. The area thus outlined was incised deep to adipose tissue with a #15 scalpel blade. The skin margins were undermined to an appropriate distance in all directions utilizing iris scissors. Following this, the designed flap was advanced and carried over into the primary defect and sutured into place.

## 2023-05-27 NOTE — DISCHARGE NOTE PROVIDER - NPI NUMBER (FOR SYSADMIN USE ONLY) :
Please return to the emergency department for any worsening symptoms.  Please follow-up with your PCP.  If you have any concerns or questions please return the emergency department.     Have sutures removed in 10 days  
[[phone]],[[phone]]

## 2023-07-08 ENCOUNTER — APPOINTMENT (OUTPATIENT)
Dept: ORTHOPEDIC SURGERY | Facility: CLINIC | Age: 28
End: 2023-07-08

## 2023-07-08 ENCOUNTER — APPOINTMENT (OUTPATIENT)
Dept: ORTHOPEDIC SURGERY | Facility: CLINIC | Age: 28
End: 2023-07-08
Payer: COMMERCIAL

## 2023-07-08 PROCEDURE — 73552 X-RAY EXAM OF FEMUR 2/>: CPT | Mod: LT

## 2023-07-20 ENCOUNTER — APPOINTMENT (OUTPATIENT)
Dept: ORTHOPEDIC SURGERY | Facility: CLINIC | Age: 28
End: 2023-07-20
Payer: COMMERCIAL

## 2023-07-20 ENCOUNTER — NON-APPOINTMENT (OUTPATIENT)
Age: 28
End: 2023-07-20

## 2023-07-20 DIAGNOSIS — M86.152: ICD-10-CM

## 2023-07-20 DIAGNOSIS — S81.802A UNSPECIFIED OPEN WOUND, LEFT LOWER LEG, INITIAL ENCOUNTER: ICD-10-CM

## 2023-07-20 PROCEDURE — 99213 OFFICE O/P EST LOW 20 MIN: CPT

## 2023-07-20 PROCEDURE — 73552 X-RAY EXAM OF FEMUR 2/>: CPT | Mod: LT

## 2023-07-20 NOTE — IMAGING
[de-identified] :   Pleasant young man sits comfortably my office in no distress.  \par \par Physical examination:\par Left knee:  He is able extend his knee against gravity without difficulty.  Surgical and traumatic incisions have all healed.  There is no significant effusion about his knee.  Some mild crepitus with range of motion.  Knee motion 0-120 degrees.  Calf soft no cords.  No geniculate lymph nodes or masses.\par \par Radiographs: \par Left femur (AP, lateral):  Healed femur fracture.  No signs of infection.  No periosteal reaction.  Remnants of were old hardware remained.

## 2023-07-20 NOTE — HISTORY OF PRESENT ILLNESS
[de-identified] :  28-year-old gentleman returns for interval follow-up status post I&D and removal previously placed IM nail for femur fracture November 1, 2021.  He was initially admitted October 20th 2021 with a wound on his leg.  Subsequent workup over the course of month demonstrate a chronic osteomyelitis.  This resulted in surgical debridement hardware removal.  Subsequent to the surgery he underwent a course of rehabilitation as is wounds healed.  He was placed on a prolonged course of IV and then subsequent oral antibiotics.  Mid winter 2022 he was cleared to return to work.  He returned to work in March of 2022. he does not report any current symptoms of fevers or drainage.  Mild discomfort which is treated with over-the-counter Tylenol.  Would like baseline blood test to be drawn to rule out infection but he does not report any drainage or signs of infection.
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
## 2024-02-29 NOTE — PROGRESS NOTE ADULT - ASSESSMENT
ORTHOPEDIC SURGERY Progress note    26y Male s/p L femur removal of hardware, I&D POD 1. No active complains  Denies numbness/tingling.  Denies f/c/n/v/cp/sob.    Medications:  acetaminophen     Tablet .. 650 milliGRAM(s) Oral every 6 hours PRN  ampicillin  IVPB 2 Gram(s) IV Intermittent every 4 hours  cefTRIAXone   IVPB 1000 milliGRAM(s) IV Intermittent every 24 hours  diphenhydrAMINE 25 milliGRAM(s) Oral at bedtime PRN  enoxaparin Injectable 40 milliGRAM(s) SubCutaneous daily  HYDROmorphone  Injectable 0.5 milliGRAM(s) IV Push every 10 minutes PRN  HYDROmorphone  Injectable 1 milliGRAM(s) IV Push every 10 minutes PRN  HYDROmorphone  Injectable 1 milliGRAM(s) IV Push every 10 minutes PRN  HYDROmorphone  Injectable 0.5 milliGRAM(s) IV Push every 10 minutes PRN  lactated ringers. 1000 milliLiter(s) IV Continuous <Continuous>  magnesium hydroxide Suspension 30 milliLiter(s) Oral daily PRN  multivitamin 1 Tablet(s) Oral daily  ondansetron Injectable 4 milliGRAM(s) IV Push once PRN  ondansetron Injectable 4 milliGRAM(s) IV Push every 6 hours PRN  oxyCODONE    IR 5 milliGRAM(s) Oral every 4 hours PRN    No Known Allergies      PMH/PSH:      Exam:  T(C): 37.3 (11-01-21 @ 22:20), Max: 37.3 (11-01-21 @ 22:20)  HR: 95 (11-01-21 @ 23:15) (67 - 100)  BP: 123/66 (11-01-21 @ 23:15) (104/56 - 144/66)  RR: 21 (11-01-21 @ 23:15) (15 - 21)  SpO2: 99% (11-01-21 @ 23:15) (97% - 100%)  General: Awake, alert, NAD, AAOx3    LLE: Dressing c/d/i, dressing from abscess site stained with sanguinous drainage SILT s/s/sp/dp/t.  Motor intact EHL, TA, Gastroc.  No ecchymosis or gross deformity.  Palpable DP, PT pulses    Labs:                        12.8   6.70  )-----------( 271      ( 31 Oct 2021 12:39 )             40.0     10-31    139  |  104  |  13  ----------------------------<  98  4.1   |  24  |  0.7    Ca    9.4      31 Oct 2021 12:39  Phos  3.9     10-31  Mg     2.1     10-31    TPro  7.2  /  Alb  4.3  /  TBili  0.2  /  DBili  x   /  AST  15  /  ALT  26  /  AlkPhos  131<H>  10-31    PT/INR - ( 31 Oct 2021 12:39 )   PT: 12.50 sec;   INR: 1.09 ratio         PTT - ( 31 Oct 2021 12:39 )  PTT:37.5 sec      A/P:  26yMale with s/p L femur removal of hardware, I &D POD 1     - WBAT LLE  - ABX per ID  - DVT PPX  - Burn consult for abscess wound   - F/u cultures     No